# Patient Record
Sex: MALE | Race: WHITE | NOT HISPANIC OR LATINO | Employment: FULL TIME | URBAN - METROPOLITAN AREA
[De-identification: names, ages, dates, MRNs, and addresses within clinical notes are randomized per-mention and may not be internally consistent; named-entity substitution may affect disease eponyms.]

---

## 2017-10-24 ENCOUNTER — RECORDS - HEALTHEAST (OUTPATIENT)
Dept: LAB | Facility: CLINIC | Age: 32
End: 2017-10-24

## 2017-10-24 LAB — HIV 1+2 AB+HIV1 P24 AG SERPL QL IA: NEGATIVE

## 2017-10-25 LAB — SYPHILIS RPR SCREEN - HISTORICAL: NORMAL

## 2018-05-25 ENCOUNTER — RECORDS - HEALTHEAST (OUTPATIENT)
Dept: LAB | Facility: CLINIC | Age: 33
End: 2018-05-25

## 2018-05-25 LAB — HIV 1+2 AB+HIV1 P24 AG SERPL QL IA: NEGATIVE

## 2018-05-26 LAB — T PALLIDUM AB SER QL: NEGATIVE

## 2018-05-29 LAB
C TRACH DNA SPEC QL PROBE+SIG AMP: NEGATIVE
N GONORRHOEA DNA SPEC QL NAA+PROBE: NEGATIVE

## 2020-01-20 ENCOUNTER — COMMUNICATION - HEALTHEAST (OUTPATIENT)
Dept: SCHEDULING | Facility: CLINIC | Age: 35
End: 2020-01-20

## 2020-10-27 ENCOUNTER — COMMUNICATION - HEALTHEAST (OUTPATIENT)
Dept: SCHEDULING | Facility: CLINIC | Age: 35
End: 2020-10-27

## 2020-11-18 ENCOUNTER — RECORDS - HEALTHEAST (OUTPATIENT)
Dept: ADMINISTRATIVE | Facility: OTHER | Age: 35
End: 2020-11-18

## 2020-11-19 ENCOUNTER — OFFICE VISIT - HEALTHEAST (OUTPATIENT)
Dept: FAMILY MEDICINE | Facility: CLINIC | Age: 35
End: 2020-11-19

## 2020-11-19 DIAGNOSIS — R00.0 TACHYCARDIA: ICD-10-CM

## 2020-11-19 DIAGNOSIS — Z11.59 ENCOUNTER FOR HEPATITIS C SCREENING TEST FOR LOW RISK PATIENT: ICD-10-CM

## 2020-11-19 DIAGNOSIS — N52.8 OTHER MALE ERECTILE DYSFUNCTION: ICD-10-CM

## 2020-11-19 DIAGNOSIS — F41.9 ANXIETY: ICD-10-CM

## 2020-11-19 DIAGNOSIS — Z00.00 HEALTH CARE MAINTENANCE: ICD-10-CM

## 2020-11-19 LAB
ALBUMIN SERPL-MCNC: 3.6 G/DL (ref 3.5–5)
ALP SERPL-CCNC: 85 U/L (ref 45–120)
ALT SERPL W P-5'-P-CCNC: 144 U/L (ref 0–45)
ANION GAP SERPL CALCULATED.3IONS-SCNC: 10 MMOL/L (ref 5–18)
AST SERPL W P-5'-P-CCNC: 74 U/L (ref 0–40)
BILIRUB SERPL-MCNC: 0.2 MG/DL (ref 0–1)
BUN SERPL-MCNC: 19 MG/DL (ref 8–22)
CALCIUM SERPL-MCNC: 9.3 MG/DL (ref 8.5–10.5)
CHLORIDE BLD-SCNC: 101 MMOL/L (ref 98–107)
CHOLEST SERPL-MCNC: 255 MG/DL
CO2 SERPL-SCNC: 25 MMOL/L (ref 22–31)
CREAT SERPL-MCNC: 0.85 MG/DL (ref 0.7–1.3)
FASTING STATUS PATIENT QL REPORTED: NO
GFR SERPL CREATININE-BSD FRML MDRD: >60 ML/MIN/1.73M2
GLUCOSE BLD-MCNC: 84 MG/DL (ref 70–125)
HDLC SERPL-MCNC: 43 MG/DL
LDLC SERPL CALC-MCNC: 172 MG/DL
POTASSIUM BLD-SCNC: 4.5 MMOL/L (ref 3.5–5)
PROT SERPL-MCNC: 7.8 G/DL (ref 6–8)
SODIUM SERPL-SCNC: 136 MMOL/L (ref 136–145)
TRIGL SERPL-MCNC: 201 MG/DL

## 2020-11-19 RX ORDER — GABAPENTIN 300 MG/1
600 CAPSULE ORAL 3 TIMES DAILY
Status: SHIPPED | COMMUNITY
Start: 2020-11-06 | End: 2022-02-11

## 2020-11-19 RX ORDER — NALOXONE HYDROCHLORIDE 4 MG/.1ML
SPRAY NASAL
Status: SHIPPED | COMMUNITY
Start: 2020-11-06 | End: 2022-02-11

## 2020-11-19 RX ORDER — FLUOXETINE 40 MG/1
40 CAPSULE ORAL DAILY
Qty: 90 CAPSULE | Refills: 3 | Status: SHIPPED | OUTPATIENT
Start: 2020-11-19 | End: 2022-02-11

## 2020-11-19 RX ORDER — LORATADINE 10 MG/1
10 TABLET ORAL
Status: SHIPPED | COMMUNITY
Start: 2020-11-06

## 2020-11-19 RX ORDER — HYDROXYZINE PAMOATE 50 MG/1
50 CAPSULE ORAL
Status: SHIPPED | COMMUNITY
Start: 2020-11-06 | End: 2022-02-11

## 2020-11-19 RX ORDER — BUSPIRONE HYDROCHLORIDE 7.5 MG/1
7.5 TABLET ORAL 3 TIMES DAILY
Qty: 180 TABLET | Refills: 3 | Status: SHIPPED | OUTPATIENT
Start: 2020-11-19 | End: 2022-02-11

## 2020-11-19 ASSESSMENT — MIFFLIN-ST. JEOR: SCORE: 1946.9

## 2020-11-20 ENCOUNTER — COMMUNICATION - HEALTHEAST (OUTPATIENT)
Dept: FAMILY MEDICINE | Facility: CLINIC | Age: 35
End: 2020-11-20

## 2020-11-20 LAB — HCV AB SERPL QL IA: NEGATIVE

## 2020-12-11 ENCOUNTER — RECORDS - HEALTHEAST (OUTPATIENT)
Dept: ADMINISTRATIVE | Facility: OTHER | Age: 35
End: 2020-12-11

## 2021-02-10 ENCOUNTER — COMMUNICATION - HEALTHEAST (OUTPATIENT)
Dept: FAMILY MEDICINE | Facility: CLINIC | Age: 36
End: 2021-02-10

## 2021-02-10 DIAGNOSIS — N52.8 OTHER MALE ERECTILE DYSFUNCTION: ICD-10-CM

## 2021-02-10 RX ORDER — SILDENAFIL CITRATE 20 MG/1
20 TABLET ORAL
Qty: 5 TABLET | Refills: 0 | Status: SHIPPED | OUTPATIENT
Start: 2021-02-10 | End: 2022-02-11

## 2021-03-31 ENCOUNTER — COMMUNICATION - HEALTHEAST (OUTPATIENT)
Dept: FAMILY MEDICINE | Facility: CLINIC | Age: 36
End: 2021-03-31

## 2021-05-02 ENCOUNTER — HEALTH MAINTENANCE LETTER (OUTPATIENT)
Age: 36
End: 2021-05-02

## 2021-05-25 ENCOUNTER — COMMUNICATION - HEALTHEAST (OUTPATIENT)
Dept: FAMILY MEDICINE | Facility: CLINIC | Age: 36
End: 2021-05-25

## 2021-05-25 ENCOUNTER — OFFICE VISIT - HEALTHEAST (OUTPATIENT)
Dept: FAMILY MEDICINE | Facility: CLINIC | Age: 36
End: 2021-05-25

## 2021-05-25 DIAGNOSIS — L30.9 PERIORBITAL DERMATITIS: ICD-10-CM

## 2021-05-25 RX ORDER — CYCLOBENZAPRINE HCL 10 MG
TABLET ORAL
Status: SHIPPED | COMMUNITY
Start: 2020-12-02 | End: 2022-02-11

## 2021-05-25 RX ORDER — TACROLIMUS 1 MG/G
OINTMENT TOPICAL
Qty: 30 G | Refills: 0 | Status: SHIPPED | OUTPATIENT
Start: 2021-05-25 | End: 2022-02-11

## 2021-05-25 RX ORDER — DIAPER,BRIEF,INFANT-TODD,DISP
EACH MISCELLANEOUS
Qty: 30 G | Refills: 0 | Status: SHIPPED | OUTPATIENT
Start: 2021-05-25 | End: 2023-01-02

## 2021-05-25 ASSESSMENT — MIFFLIN-ST. JEOR: SCORE: 1992.71

## 2021-06-05 VITALS
SYSTOLIC BLOOD PRESSURE: 118 MMHG | HEIGHT: 71 IN | HEART RATE: 103 BPM | DIASTOLIC BLOOD PRESSURE: 88 MMHG | BODY MASS INDEX: 30.24 KG/M2 | OXYGEN SATURATION: 97 % | WEIGHT: 216 LBS

## 2021-06-05 NOTE — TELEPHONE ENCOUNTER
Girlfriend calling.for patient who has never been seen here at .  She states her boyfriend is home sick, and she is calling to have him seen.  Got Tamiflu. On Friday. [facility].  Still feeling ill after taking the Tamiflu., has one pill left.  Has had temp 100.0     Feels worse today.  And has never been seen here at , but wants a same day appointment here.  Unable to get authorization from patient to speak to girlfriend because he was at home, and she was at work.    Advised to make soonest appointment , tomorrow AM @ [facility]. With Dr. Alford   @ 8:20am.    Yecenia Mccormick RN  [facility] Triage/refill nurse

## 2021-06-12 NOTE — TELEPHONE ENCOUNTER
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 Just a FYI. He will be there for awhile per patient's girlfriend.   Okay to leave a detailed message?:  Yes

## 2021-06-13 NOTE — PROGRESS NOTES
Assessment/Plan:     Patient presents today for routine physical examination.    Healthcare Maintenance: USPSTF recommendations for age 35;  Patient has been counseled on/screened for:  - intimate partner violence and there are no concerns at this time  - a healthful diet and physical activity for CVD prevention  - Diabetes and hyperlipidemia: screening was performed.   Sexually transmitted infections: Patient would not like to be screened for chlamydia, gonorrhea, syphilis, HIV, and hepatitis  Immunizations: up to date except for td which was recommended    Additional concerns are as detailed below:    1. Anxiety  Refilled fluoxetine at his current dose so that he did not need to take 2 tablets.  Recommended buspirone be taken 3 times daily instead of twice daily.  Recommended hydroxyzine continue to be used at night, or decrease the dose so that he can use it during the day.  The increased dose and gabapentin should remain the same.  Continue therapy.  - FLUoxetine (PROZAC) 40 MG capsule; Take 1 capsule (40 mg total) by mouth daily.  Dispense: 90 capsule; Refill: 3  - busPIRone (BUSPAR) 7.5 MG tablet; Take 1 tablet (7.5 mg total) by mouth 3 (three) times a day.  Dispense: 180 tablet; Refill: 3    2. Other male erectile dysfunction  Discussed that he might need to see a urologist at some point to address the underlying concerns for someone this young experiencing erectile dysfunction.    3. Tachycardia  Patient is a history of tachycardia appreciated in the hospital.  The tachycardia is minimal today and he does have a history of anxiety, is meeting a new provider today, and recently had poor medical experiences.  It is reasonable to just continue to monitor as he is asymptomatic currently.    AVS printed and given to patient.  Return to clinic in 6 months.     I have had an Advance Directives discussion with the patient.    This note has been dictated using voice recognition software. Any grammatical or context  "distortions are unintentional and inherent to the the software.     Nicolette Burgos MD  [facility]    Subjective:     Rico Estrada is a 35 y.o. male who presents to clinic for routine physical.    Additional concerns include:    1.  Patient recently hospitalized for Ashby-Fritz's turned TENS.  He is overall doing well and attempting slow recovery at home.  He is attempting to walk every day but misses working out and feels deconditioned.  His mood has also been worsened during this timing.  He has baseline anxiety and recent hospitalization did not help.  He is currently on 7.5 mg buspirone twice daily and max dose of fluoxetine at 40 mg.  He does take gabapentin and hydroxyzine for nerve pain and itching, but understands these can also be used to help with anxiety.  Patient is seeing a therapist.  He does use marijuana intermittently.    PHQ-2 Score:  0    Health Care Directive: discussed    Patient Care Team:   PCP: Nicolette Burgos     Past Medical History, Family History, and Social History reviewed.     Review of systems:  Patient endorses: Weight changes, fever, weakness, fatigue, blurry vision, vision changes, wheezing, allergy symptoms, nausea, vomiting, back pain, rashes, increased thirst and sexual dysfunction  The remainder of the 10 system review is otherwise negative.    Objective:     /88   Pulse (!) 103   Ht 5' 11\" (1.803 m)   Wt 216 lb (98 kg)   SpO2 97%   BMI 30.13 kg/m    Gen: Alert, NAD, appears stated age, normal hygiene   Eyes: conjunctivae without injection, sclera clear, EOMI  ENT/mouth: nares clear, septum midline, absent rhinorrhea,absent pharyngeal injection, neck is supple, no thyroid enlargement  CV: RRR, no murmur appreciated, pedal edema absent bilaterally  Resp: CTAB, no wheezes, rales or ronchi  ABD: normoactive, non-tender to palpation, nondistended  MSK: grossly full range of motion in all joints, no obvious deformity  Neuro: CN II-XII " grossly intact, no deficits in coordination  Psych: no apparent hallucinations or delusions, no pressured speech; alert, oriented x3  SKIN: Diffuse erythematous plaques appreciated across most of patient's exposed skin  Heme/lymph: no pallor, no active bleeding/bruising, no adenopathy appreciated    Medications:  Current Outpatient Medications   Medication Sig     HYDROmorphone (DILAUDID) 2 MG tablet Take 1-2 mg by mouth.     hydrOXYzine pamoate (VISTARIL) 50 MG capsule Take 50 mg by mouth.     loratadine (CLARITIN) 10 mg tablet Take 10 mg by mouth.     methadone (DOLOPHINE) 5 MG tablet Take 5 mg by mouth.     busPIRone (BUSPAR) 7.5 MG tablet Take 1 tablet (7.5 mg total) by mouth 3 (three) times a day.     FLUoxetine (PROZAC) 40 MG capsule Take 1 capsule (40 mg total) by mouth daily.     gabapentin (NEURONTIN) 300 MG capsule Take 600 mg by mouth 3 (three) times a day.     NARCAN 4 mg/actuation nasal spray      sildenafil (REVATIO) 20 mg tablet TAKE 1 TO 2 TABLETS BY MOUTH AS NEEDED FOR ED       Allergies:  Allergies   Allergen Reactions     Sulfamethoxazole-Trimethoprim Rash     SJS, TENS       PMH:  Past Medical History:   Diagnosis Date     Allergies      Anxiety      TENS (toxic epidermal necrolysis) (H)     bactrim       PSH:  History reviewed. No pertinent surgical history.    Family Hx:  Family History   Problem Relation Age of Onset     Diabetes Mother      Heart disease Father      Hypertension Father      Stroke Paternal Uncle         56     Heart disease Paternal Grandmother      Heart disease Paternal Grandfather        Social History:  Social History     Socioeconomic History     Marital status: Single     Spouse name: Not on file     Number of children: Not on file     Years of education: Not on file     Highest education level: Not on file   Occupational History     Not on file   Social Needs     Financial resource strain: Not on file     Food insecurity     Worry: Not on file     Inability: Not on  file     Transportation needs     Medical: Not on file     Non-medical: Not on file   Tobacco Use     Smoking status: Never Smoker     Smokeless tobacco: Never Used   Substance and Sexual Activity     Alcohol use: Yes     Alcohol/week: 10.0 standard drinks     Types: 10 Cans of beer per week     Comment: 10 beers all at once but only 1-3 per month     Drug use: Yes     Types: Marijuana     Sexual activity: Yes     Partners: Female   Lifestyle     Physical activity     Days per week: Not on file     Minutes per session: Not on file     Stress: Not on file   Relationships     Social connections     Talks on phone: Not on file     Gets together: Not on file     Attends [employer] service: Not on file     Active member of club or organization: Not on file     Attends meetings of clubs or organizations: Not on file     Relationship status: Not on file     Intimate partner violence     Fear of current or ex partner: Not on file     Emotionally abused: Not on file     Physically abused: Not on file     Forced sexual activity: Not on file   Other Topics Concern     Not on file   Social History Narrative     Not on file       No results found for: LDLCALC     Immunization History   Administered Date(s) Administered     Hep B, Peds or Adolescent 08/08/2000     Influenza [id number], 11/27/2009     Influenza, Seasonal, Inj PF IIV3 11/27/2009, 10/02/2015     Influenza,inj,MDCK,PF,Quad >4yrs 11/15/2018     Influenza,seasonal, Inj IIV3 11/01/2005, 12/16/2011, 10/01/2012, 10/07/2014     Influenza,seasonal,quad inj =/> 6months 08/30/2017     MMR 08/08/2000     Td, adult adsorbed, PF 08/08/2000     Tdap 06/02/2009         Topic Date Due     HEPATITIS B VACCINES (2 of 3 - 3-dose primary series) 09/05/2000

## 2021-06-16 PROBLEM — R73.9 HYPERGLYCEMIA: Status: ACTIVE | Noted: 2020-10-30

## 2021-06-16 PROBLEM — R00.0 TACHYCARDIA: Status: ACTIVE | Noted: 2020-10-30

## 2021-06-16 PROBLEM — E87.1 HYPONATREMIA: Status: ACTIVE | Noted: 2020-10-30

## 2021-06-16 PROBLEM — L51.2: Status: ACTIVE | Noted: 2020-10-28

## 2021-06-16 PROBLEM — N52.8 OTHER MALE ERECTILE DYSFUNCTION: Status: ACTIVE | Noted: 2020-11-19

## 2021-06-17 NOTE — TELEPHONE ENCOUNTER
LMTCB- has apt today with Dr Peter at 1:40, wondering if he can came at 2:20 today instead. If unable to switch ok to keep original apt time    If patient reschedules we need to change the [address] to a 40 min apt

## 2021-06-18 ENCOUNTER — COMMUNICATION - HEALTHEAST (OUTPATIENT)
Dept: FAMILY MEDICINE | Facility: CLINIC | Age: 36
End: 2021-06-18

## 2021-06-18 DIAGNOSIS — L30.9 PERIORBITAL DERMATITIS: ICD-10-CM

## 2021-06-18 DIAGNOSIS — N52.8 OTHER MALE ERECTILE DYSFUNCTION: ICD-10-CM

## 2021-06-18 DIAGNOSIS — L29.9 PRURITUS: ICD-10-CM

## 2021-06-18 DIAGNOSIS — M62.838 SPASM OF MUSCLE: ICD-10-CM

## 2021-06-18 RX ORDER — HYDROXYZINE PAMOATE 50 MG/1
50 CAPSULE ORAL 3 TIMES DAILY PRN
Qty: 90 CAPSULE | Refills: 0 | Status: SHIPPED | OUTPATIENT
Start: 2021-06-18 | End: 2022-02-11

## 2021-06-18 RX ORDER — SILDENAFIL CITRATE 20 MG/1
20 TABLET ORAL DAILY PRN
Qty: 5 TABLET | Refills: 1 | Status: SHIPPED | OUTPATIENT
Start: 2021-06-18 | End: 2022-02-11

## 2021-06-18 RX ORDER — CYCLOBENZAPRINE HCL 10 MG
10 TABLET ORAL 3 TIMES DAILY PRN
Qty: 30 TABLET | Refills: 1 | Status: SHIPPED | OUTPATIENT
Start: 2021-06-18 | End: 2022-02-11

## 2021-06-21 NOTE — LETTER
Letter by Nicolette Burgos MD at      Author: Nicolette Burgos MD Service: -- Author Type: --    Filed:  Encounter Date: 11/20/2020 Status: (Other)         Rico Estrada  [address]  [place] [address]             November 20, 2020         Dear Mr. Estrada,    Below are the results from your recent visit:    Resulted Orders   Comprehensive Metabolic Panel   Result Value Ref Range    Sodium 136 136 - 145 mmol/L    Potassium 4.5 3.5 - 5.0 mmol/L    Chloride 101 98 - 107 mmol/L    CO2 25 22 - 31 mmol/L    Anion Gap, Calculation 10 5 - 18 mmol/L    Glucose 84 70 - 125 mg/dL    BUN 19 8 - 22 mg/dL    Creatinine 0.85 0.70 - 1.30 mg/dL    GFR MDRD Af Amer >60 >60 mL/min/1.73m2    GFR MDRD Non Af Amer >60 >60 mL/min/1.73m2    Bilirubin, Total 0.2 0.0 - 1.0 mg/dL    Calcium 9.3 8.5 - 10.5 mg/dL    Protein, Total 7.8 6.0 - 8.0 g/dL    Albumin 3.6 3.5 - 5.0 g/dL    Alkaline Phosphatase 85 45 - 120 U/L    AST 74 (H) 0 - 40 U/L     (H) 0 - 45 U/L    Narrative    Fasting Glucose reference range is 70-99 mg/dL per  American Diabetes Association (ADA) guidelines.   Lipid Cascade RANDOM   Result Value Ref Range    Cholesterol 255 (H) <=199 mg/dL    Triglycerides 201 (H) <=149 mg/dL    HDL Cholesterol 43 >=40 mg/dL    LDL Calculated 172 (H) <=129 mg/dL    Patient Fasting > 8hrs? No        Your hepatitis C is still pending at the time that I made this letter, but we anticipate that that would be negative.  Someone will call you if that is not true.    Your cholesterol is significantly elevated, but not so high that we would start a statin medication given your age.  However, you are headed in that direction and I think it might be worthwhile, once things settle down, to focus on diet that would lower cholesterol.  If you have any questions about this you can reach out to me.    Finally, your liver function testing indicated some significantly elevated values.  I suspect this is secondary to the intense  trauma you just experienced.  I think that we should retest this in 3 months and not worry about it right now.    Please call with questions or contact us using [facility].    Sincerely,        Electronically signed by Nicolette Burgos MD

## 2021-06-25 NOTE — PROGRESS NOTES
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  Believe this is more of a coincidence than a true causation.  Believe this most likely represents periorbital dermatitis but differential includes contact dermatitis, fungal, among others.  We will trial patient on combination tacrolimus and hydrocortisone ointment.  Recommended he do these twice daily for 2 weeks and then contact me if symptoms are not improving.  At that time would reconsider and possibly refer to dermatology for further work-up.    1. Periorbital dermatitis  - tacrolimus (PROTOPIC) 0.1 % ointment; Apply around eyes twice daily for up to two weeks  Dispense: 30 g; Refill: 0  - hydrocortisone 1 % ointment; Apply around eyes twice daily for up to two weeks  Dispense: 30 g; Refill: 0    Follow up: PRN  Options for treatment and follow-up care were reviewed with the patient and/or guardian. Rico Estrada and/or guardian engaged in the decision making process and verbalized understanding of the options discussed and agreed with the final plan.    Dr. Francisco Gonzalez MD         HPI:   Rico Estrada is a 35 y.o.  male with problems as below, who presents for:    Chief Complaint   Patient presents with     Rash     rash developed shortly after getting 2nd covid vaccine, itches and burns at times     Low energy     very low energy since getting the covid vaccine     Patient received his Covid shot second dose in series approximately 3 weeks ago.  He states that he developed a rash over his eyes very next day.  This has not really changed since it started.  He describes it as dry irritated and sometimes pruritic.  He has never had a rash like this before and no other spots on his body contain the rash.  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   "Confirmed this in the chart causing admission in October.  Thus he states he has had skin changes related to this even after his recovery.  Does not think this is related though.         PMHX:     Patient Active Problem List   Diagnosis     Hyperglycemia     Hyponatremia     Tachycardia     Toxic epidermal necrolysis (H)     Other male erectile dysfunction       Current Outpatient Medications   Medication Sig Dispense Refill     busPIRone (BUSPAR) 7.5 MG tablet Take 1 tablet (7.5 mg total) by mouth 3 (three) times a day. 180 tablet 3     cyclobenzaprine (FLEXERIL) 10 MG tablet        FLUoxetine (PROZAC) 40 MG capsule Take 1 capsule (40 mg total) by mouth daily. 90 capsule 3     gabapentin (NEURONTIN) 300 MG capsule Take 600 mg by mouth 3 (three) times a day.       hydrOXYzine pamoate (VISTARIL) 50 MG capsule Take 50 mg by mouth.       loratadine (CLARITIN) 10 mg tablet Take 10 mg by mouth.       NARCAN 4 mg/actuation nasal spray        sildenafil (REVATIO) 20 mg tablet Take 1 tablet (20 mg total) by mouth once as needed. 5 tablet 0     No current facility-administered medications for this visit.        Social History     Tobacco Use     Smoking status: Never Smoker     Smokeless tobacco: Never Used   Substance Use Topics     Alcohol use: Yes     Alcohol/week: 10.0 standard drinks     Types: 10 Cans of beer per week     Comment: 10 beers all at once but only 1-3 per month     Drug use: Yes     Types: Marijuana       Social History     Social History Narrative     Not on file       Allergies   Allergen Reactions     Sulfamethoxazole-Trimethoprim Rash     SJS, TENS              Review of Systems:    Complete ROS is negative except as noted in the HPI         Physical Exam:   /84   Pulse 69   Temp 98.4  F (36.9  C) (Oral)   Resp 16   Ht 5' 11\" (1.803 m)   Wt (!) 226 lb 1.6 oz (102.6 kg)   SpO2 96%   BMI 31.53 kg/m      General appearance: Alert, cooperative, no distress, appears stated age  Head: " Normocephalic, atraumatic, without obvious abnormality  Eyes: Pupils equal round, reactive.  Conjunctiva clear.  Neck: Supple, symmetric, trachea midline  Lungs: Clear to auscultation bilaterally, no wheezing or crackles present.  Respirations unlabored  Heart: Regular rate and rhythm, normal S1 and S2, no murmur, rub or gallop.  Skin: Xerotic, flaky, irritated rash over eyelids bilaterally.  No central clearing or water mani type rash.  No nodularity to rash.  Spares the eyelashes.

## 2021-06-26 NOTE — TELEPHONE ENCOUNTER
Patient is calling to request a refill of hydroxyzine as needed for itching due to seasonal allergies, cyclobenzaprine, and sildenafil.    He states when he was in on 5/25/2021 to see Dr. Lundy he discussed these refills with Dr. Lundy's assistant however refills were never sent to the pharmacy.      Please refill if appropriate.  Medication name, dose, and instructions reviewed with the patient.

## 2021-06-26 NOTE — TELEPHONE ENCOUNTER
RN cannot approve Refill Request    RN can NOT refill this medication med is not covered by policy/route to provider. Last office visit: 5/25/2021 Franicsco Lundy MD Last Physical: Visit date not found Last MTM visit: Visit date not found Last visit same specialty: 5/25/2021 Francisco Lundy MD.  Next visit within 3 mo: Visit date not found  Next physical within 3 mo: Visit date not found      Delaney Kenny, [facility] Connection Triage/Med Refill 6/18/2021    Requested Prescriptions   Pending Prescriptions Disp Refills     hydrocortisone 1 % ointment [Pharmacy Med Name: HYDROCORTISONE 1% OINTMENT  28.35GM] 28.35 g 0     Sig: APPLY AROUND EYES TWICE DAILY FOR UP TO TWO WEEKS.       There is no refill protocol information for this order        tacrolimus (PROTOPIC) 0.1 % ointment [Pharmacy Med Name: PROTOPIC 0.1% OINTMENT 30GM] 30 g 0     Sig: APPLY AROUND EYES TWICE DAILY FOR UP TO 2 WEEKS.       There is no refill protocol information for this order

## 2021-06-30 ENCOUNTER — COMMUNICATION - HEALTHEAST (OUTPATIENT)
Dept: FAMILY MEDICINE | Facility: CLINIC | Age: 36
End: 2021-06-30

## 2021-06-30 DIAGNOSIS — L30.9 PERIORBITAL DERMATITIS: ICD-10-CM

## 2021-07-01 RX ORDER — TACROLIMUS 1 MG/G
OINTMENT TOPICAL
Qty: 30 G | Refills: 0 | Status: SHIPPED | OUTPATIENT
Start: 2021-07-01 | End: 2023-01-02

## 2021-07-02 ENCOUNTER — COMMUNICATION - HEALTHEAST (OUTPATIENT)
Dept: FAMILY MEDICINE | Facility: CLINIC | Age: 36
End: 2021-07-02

## 2021-07-02 DIAGNOSIS — L30.9 PERIORBITAL DERMATITIS: ICD-10-CM

## 2021-07-02 RX ORDER — DIAPER,BRIEF,INFANT-TODD,DISP
EACH MISCELLANEOUS
Qty: 28.35 G | Refills: 0 | Status: SHIPPED | OUTPATIENT
Start: 2021-07-02 | End: 2023-01-02

## 2021-07-04 NOTE — TELEPHONE ENCOUNTER
Telephone Encounter by Gricel Cisneros RN at 6/30/2021  7:59 AM     Author: Gricel Cisneros RN Service: -- Author Type: Registered Nurse    Filed: 6/30/2021  7:59 AM Encounter Date: 6/30/2021 Status: Signed    : Gricel Cisneros RN (Registered Nurse)       RN cannot approve Refill Request    RN can NOT refill this medication med is not covered by policy/route to provider. Last office visit: 5/25/2021 Francisco Lundy MD Last Physical: Visit date not found Last MTM visit: Visit date not found Last visit same specialty: 5/25/2021 Francisco Lundy MD.  Next visit within 3 mo: Visit date not found  Next physical within 3 mo: Visit date not found      Gricel Cisneros [facility] Connection Triage/Med Refill 6/30/2021    Requested Prescriptions   Pending Prescriptions Disp Refills   ? tacrolimus (PROTOPIC) 0.1 % ointment [Pharmacy Med Name: PROTOPIC 0.1% OINTMENT 30GM] 30 g 0     Sig: APPLY AROUND EYES TWICE DAILY FOR UP TO 2 WEEKS.       There is no refill protocol information for this order

## 2021-07-04 NOTE — TELEPHONE ENCOUNTER
Telephone Encounter by Kel Wagoner, RN at 7/2/2021  7:37 AM     Author: Kel Wagoner, RN Service: -- Author Type: Registered Nurse    Filed: 7/2/2021  7:38 AM Encounter Date: 7/2/2021 Status: Signed    : Kel Wagoner RN (Registered Nurse)       RN cannot approve Refill Request    RN can NOT refill this medication med is not covered by policy/route to provider. Last office visit: 5/25/2021 Francisco Lundy MD Last Physical: Visit date not found Last MTM visit: Visit date not found Last visit same specialty: 5/25/2021 Francisco Lundy MD.  Next visit within 3 mo: Visit date not found  Next physical within 3 mo: Visit date not found      Kel Wagoner, [facility] Triage/Med Refill 7/2/2021    Requested Prescriptions   Pending Prescriptions Disp Refills   ? hydrocortisone 1 % ointment [Pharmacy Med Name: HYDROCORTISONE 1% OINTMENT  28.35GM] 28.35 g 0     Sig: APPLY AROUND EYES TWICE DAILY FOR UP TO TWO WEEKS.       There is no refill protocol information for this order

## 2021-07-06 VITALS
SYSTOLIC BLOOD PRESSURE: 112 MMHG | WEIGHT: 226.1 LBS | HEIGHT: 71 IN | OXYGEN SATURATION: 96 % | BODY MASS INDEX: 31.65 KG/M2 | HEART RATE: 69 BPM | RESPIRATION RATE: 16 BRPM | TEMPERATURE: 98.4 F | DIASTOLIC BLOOD PRESSURE: 84 MMHG

## 2021-07-19 DIAGNOSIS — L30.9 PERIORBITAL DERMATITIS: Primary | ICD-10-CM

## 2021-07-22 RX ORDER — DIAPER,BRIEF,INFANT-TODD,DISP
EACH MISCELLANEOUS
Qty: 28.35 G | Refills: 3 | Status: SHIPPED | OUTPATIENT
Start: 2021-07-22 | End: 2022-02-11

## 2021-07-22 NOTE — TELEPHONE ENCOUNTER
Routing refill request to provider for review/approval because:  Drug not on the refill protocol     Last Written Prescription Date:  7/2/2021  Last Fill Quantity: 28.35g,  # refills: 0   Last office visit provider:  5/25/2021     Requested Prescriptions   Pending Prescriptions Disp Refills     hydrocortisone (CORTAID) 1 % external ointment [Pharmacy Med Name: HYDROCORTISONE 1% OINTMENT  28.35GM] 28.35 g      Sig: APPLY AROUND EYES TWICE DAILY FOR UP TO TWO WEEKS.       There is no refill protocol information for this order          Tammie Severson, LPN 07/22/21 12:51 PM

## 2021-10-17 ENCOUNTER — HEALTH MAINTENANCE LETTER (OUTPATIENT)
Age: 36
End: 2021-10-17

## 2021-12-27 ENCOUNTER — TELEPHONE (OUTPATIENT)
Dept: FAMILY MEDICINE | Facility: CLINIC | Age: 36
End: 2021-12-27
Payer: COMMERCIAL

## 2021-12-27 NOTE — TELEPHONE ENCOUNTER
No answer, LUCRETIA full  Called pt to schedule video visit for ring worm this week per dr. ramsey

## 2022-02-06 ENCOUNTER — HEALTH MAINTENANCE LETTER (OUTPATIENT)
Age: 37
End: 2022-02-06

## 2022-02-11 ENCOUNTER — OFFICE VISIT (OUTPATIENT)
Dept: FAMILY MEDICINE | Facility: CLINIC | Age: 37
End: 2022-02-11
Payer: COMMERCIAL

## 2022-02-11 VITALS
OXYGEN SATURATION: 99 % | DIASTOLIC BLOOD PRESSURE: 78 MMHG | WEIGHT: 216 LBS | HEART RATE: 80 BPM | SYSTOLIC BLOOD PRESSURE: 120 MMHG | BODY MASS INDEX: 30.13 KG/M2

## 2022-02-11 DIAGNOSIS — N52.8 OTHER MALE ERECTILE DYSFUNCTION: ICD-10-CM

## 2022-02-11 DIAGNOSIS — R21 RASH AND NONSPECIFIC SKIN ERUPTION: Primary | ICD-10-CM

## 2022-02-11 DIAGNOSIS — D23.9 DERMATOFIBROMA: ICD-10-CM

## 2022-02-11 DIAGNOSIS — F41.9 ANXIETY: ICD-10-CM

## 2022-02-11 DIAGNOSIS — M62.838 SPASM OF MUSCLE: ICD-10-CM

## 2022-02-11 DIAGNOSIS — Z23 ENCOUNTER FOR IMMUNIZATION: ICD-10-CM

## 2022-02-11 PROBLEM — G89.29 CHRONIC PAIN: Status: ACTIVE | Noted: 2022-02-11

## 2022-02-11 PROBLEM — M54.50 LOW BACK PAIN: Status: ACTIVE | Noted: 2022-02-11

## 2022-02-11 PROBLEM — M54.30 SCIATICA: Status: ACTIVE | Noted: 2022-02-11

## 2022-02-11 PROBLEM — F33.1 MODERATE RECURRENT MAJOR DEPRESSION (H): Status: ACTIVE | Noted: 2022-02-11

## 2022-02-11 PROCEDURE — 0064A COVID-19,PF,MODERNA (18+ YRS BOOSTER .25ML): CPT | Performed by: FAMILY MEDICINE

## 2022-02-11 PROCEDURE — 91306 COVID-19,PF,MODERNA (18+ YRS BOOSTER .25ML): CPT | Performed by: FAMILY MEDICINE

## 2022-02-11 PROCEDURE — 99214 OFFICE O/P EST MOD 30 MIN: CPT | Performed by: FAMILY MEDICINE

## 2022-02-11 RX ORDER — SILDENAFIL CITRATE 20 MG/1
20 TABLET ORAL DAILY PRN
Qty: 5 TABLET | Refills: 1 | Status: SHIPPED | OUTPATIENT
Start: 2022-02-11 | End: 2023-01-02

## 2022-02-11 RX ORDER — CYCLOBENZAPRINE HCL 10 MG
10 TABLET ORAL 3 TIMES DAILY PRN
Qty: 30 TABLET | Refills: 1 | Status: SHIPPED | OUTPATIENT
Start: 2022-02-11 | End: 2022-03-17

## 2022-02-11 RX ORDER — PROPRANOLOL HYDROCHLORIDE 10 MG/1
10 TABLET ORAL 3 TIMES DAILY PRN
Qty: 30 TABLET | Refills: 3 | Status: SHIPPED | OUTPATIENT
Start: 2022-02-11 | End: 2022-09-08

## 2022-02-11 RX ORDER — SILDENAFIL CITRATE 20 MG/1
20 TABLET ORAL
Qty: 30 TABLET | Refills: 0 | Status: SHIPPED | OUTPATIENT
Start: 2022-02-11 | End: 2022-10-04

## 2022-02-11 RX ORDER — FLUOXETINE 40 MG/1
40 CAPSULE ORAL DAILY
Qty: 90 CAPSULE | Refills: 3 | Status: SHIPPED | OUTPATIENT
Start: 2022-02-11 | End: 2023-01-02

## 2022-02-11 RX ORDER — GABAPENTIN 300 MG/1
600 CAPSULE ORAL 3 TIMES DAILY
Qty: 90 CAPSULE | Refills: 3 | Status: SHIPPED | OUTPATIENT
Start: 2022-02-11 | End: 2023-01-02

## 2022-02-11 RX ORDER — TRIAMCINOLONE ACETONIDE 5 MG/G
CREAM TOPICAL
COMMUNITY
End: 2023-04-24

## 2022-02-11 RX ORDER — HYDROXYZINE PAMOATE 50 MG/1
50 CAPSULE ORAL 3 TIMES DAILY PRN
Qty: 90 CAPSULE | Refills: 0 | Status: SHIPPED | OUTPATIENT
Start: 2022-02-11 | End: 2022-03-10

## 2022-02-11 ASSESSMENT — PATIENT HEALTH QUESTIONNAIRE - PHQ9: SUM OF ALL RESPONSES TO PHQ QUESTIONS 1-9: 8

## 2022-02-11 NOTE — PROGRESS NOTES
"  Assessment & Plan       Anxiety  Hx of anxiety reporting increased anxiety symptoms on Sunday nights. Discussed risks of benzodiazepines, counseled on as needed propanolol to help with physical symptoms of anxiety. Patient agreeable. Psych referral placed. Continue baseline meds of hydroxyzine and gabapentin PRN and fluoxetine daily.   - gabapentin (NEURONTIN) 300 MG capsule; Take 2 capsules (600 mg) by mouth 3 times daily  - FLUoxetine (PROZAC) 40 MG capsule; Take 1 capsule (40 mg) by mouth daily  - propranolol (INDERAL) 10 MG tablet; Take 1 tablet (10 mg) by mouth 3 times daily as needed  - hydrOXYzine (VISTARIL) 50 MG capsule; Take 1 capsule (50 mg) by mouth 3 times daily as needed  - Adult Mental Health  Referral; Future    Other male erectile dysfunction  Requesting refill.  - sildenafil (REVATIO) 20 MG tablet; Take 1 tablet (20 mg) by mouth daily as needed    Spasm of muscle  Patient also started seeing chiropractor for right sided pain. May consider physical therapy referral in the future if no relief.  - cyclobenzaprine (FLEXERIL) 10 MG tablet; Take 1 tablet (10 mg) by mouth 3 times daily as needed    Rash and nonspecific skin eruption  Patient reports history of intermittent rash on posterior neck and back. Previously diagnosed as ringworm and treated with ketoconazole and Diflucan. Brings with pictures that reveal 3-4 perfectly shaped red circles with central clearing and evenly spaced along the posterior scalp/neck. Uncertain that this is consistent with ringworm. Plan to refer to dermatology. Considered self-inflicted skin manifestations and psoriasis,neither of which seem to fit the clinical picture.   - Adult Dermatology Referral; Future    Dermatofibroma  Patient reports years of \"bump\" on his left back. Exam consistent with dermatofibroma. Counseled on benign nature of this finding. Patient would like this removed eventually. May do this in the clinic otherwise can be done by dermatology. " "    Encounter for immunization  - COVID-19,PF,MODERNA (18+ YRS BOOSTER .25ML)       BMI:   Estimated body mass index is 30.13 kg/m  as calculated from the following:    Height as of 5/25/21: 1.803 m (5' 11\").    Weight as of this encounter: 98 kg (216 lb).       Return in about 4 weeks (around 3/11/2022) for Routine preventive, Follow up.    Nicolette Burgos MD  [facility] OAKMALIK Gómez is a 36 year old who presents for the following health issues     HPI     Review of Systems   ROS negative except joint pain, acne, headaches.     Ringworm:   Went to [facility] in August for ringworm like rash on the back of his neck and hairline, sometimes itchy. Told to take OTC athlete's foot topical cream but it didn't help. Then seen by thierry and was prescribed prescription ketoconazole and oral Diflucan. Made it go away, but it returned a week later. Brings photos- on the back of head, 3-4 distinct circles on posterior neck with spreading down back. Last episode in December, was taking ketoconazole cream for a month and then it went away. No symptoms today. 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     Rib pain:   Treated by chiro but requesting Cyclobenzprine refill. States he has been trying stretching. Would consider PT referral.     Bump on back:   Flesh covered bump on back for his whole life, wants it removed. States sometimes he pops it and white stuff comes out and it flattens, but it returns.     Left ear pain:  Used Qtip last week, went too deep. Still hurts. Denies ringing or hearing loss.     Anxiety:   Reports increased anxiety on Sunday nights. Wondering about xanax or seeing psych for this.      Objective    /78   Pulse 80   Wt 98 kg (216 lb)   SpO2 99%   BMI 30.13 kg/m    Body mass index is 30.13 kg/m .  Physical Exam   GENERAL: healthy, alert and no distress  HENT: Left ear canal erythematous but intact, pearly grey " TM. Right ear canal clear with intact, pearly grey TM.  Nose and mouth without ulcers or lesions  RESP: lungs clear to auscultation - no rales, rhonchi or wheezes  CV: regular rate and rhythm, normal S1 S2, no S3 or S4, no murmur, click or rub.  SKIN: no suspicious lesions or rashes. Left mid-back with 1.5 cm firm, flesh-colored raised bump consistent with dermatofibroma.   PSYCH: mentation appears normal, affect normal/bright

## 2022-02-15 ENCOUNTER — TELEPHONE (OUTPATIENT)
Dept: FAMILY MEDICINE | Facility: CLINIC | Age: 37
End: 2022-02-15
Payer: COMMERCIAL

## 2022-02-15 NOTE — TELEPHONE ENCOUNTER
PA Initiation    Medication:sildenafil (REVATIO) 20 MG tablet   Insurance Company:  AKILA COLLIER [[id number]]  Pharmacy Filling the Rx:  [facility]   Filling Pharmacy Phone:  [phone]  Filling Pharmacy Fax:  [phone]  Start Date:  02/11/2022    GORDON WHITT

## 2022-02-21 NOTE — TELEPHONE ENCOUNTER
PA Initiation    Medication: sildenafil (REVATIO) 20 MG tablet  Insurance Company: [employer] - Phone [phone] Fax [phone]  Pharmacy Filling the Rx: [id number] - [place], [place] - [address] & [facility]  Filling Pharmacy Phone: [phone]  Filling Pharmacy Fax: [phone]  Start Date: 2/21/2022

## 2022-02-21 NOTE — TELEPHONE ENCOUNTER
PRIOR AUTHORIZATION DENIED    Medication: sildenafil (REVATIO) 20 MG tablet    Denial Date: 2/21/2022    Denial Rationale: Medication is only covered for diagnosis of pulmonary arterial hypertension or secondary Raynaud's phenomenon.          Appeal Information: If provider would like to appeal this decision we will need a detailed letter of medical necessity to start the process. Then re-route this request back to the PA pool.

## 2022-03-10 DIAGNOSIS — L29.9 PRURITUS: Primary | ICD-10-CM

## 2022-03-10 RX ORDER — HYDROXYZINE PAMOATE 50 MG/1
CAPSULE ORAL
Qty: 270 CAPSULE | Refills: 3 | Status: SHIPPED | OUTPATIENT
Start: 2022-03-10

## 2022-03-10 NOTE — TELEPHONE ENCOUNTER
"Last Written Prescription Date:  2/11/22  Last Fill Quantity: 90,  # refills: 0   Last office visit provider:  2/11/22     Requested Prescriptions   Pending Prescriptions Disp Refills     hydrOXYzine (VISTARIL) 50 MG capsule [Pharmacy Med Name: HYDROXYZINE PAMOATE 50MG CAPSULES] 90 capsule 0     Sig: TAKE 1 CAPSULE(50 MG) BY MOUTH THREE TIMES DAILY AS NEEDED       Antihistamines Protocol Passed - 3/10/2022 12:39 PM        Passed - Recent (12 mo) or future (30 days) visit within the authorizing provider's specialty     Patient has had an office visit with the authorizing provider or a provider within the authorizing providers department within the previous 12 mos or has a future within next 30 days. See \"Patient Info\" tab in inbasket, or \"Choose Columns\" in Meds & Orders section of the refill encounter.              Passed - Patient is age 3 or older     Apply age and/or weight-based dosing for peds patients age 3 and older.    Forward request to provider for patients under the age of 3.          Passed - Medication is active on med list             Kel Wagoner RN 03/10/22 12:40 PM  "
Need for prophylactic measure

## 2022-03-17 DIAGNOSIS — M62.838 SPASM OF MUSCLE: ICD-10-CM

## 2022-03-17 RX ORDER — CYCLOBENZAPRINE HCL 10 MG
TABLET ORAL
Qty: 30 TABLET | Refills: 1 | Status: SHIPPED | OUTPATIENT
Start: 2022-03-17 | End: 2023-01-02

## 2022-03-17 NOTE — TELEPHONE ENCOUNTER
Routing refill request to provider for review/approval because:  Drug not on the [facility] refill protocol     Last Written Prescription Date:  2/11/22  Last Fill Quantity: 30,  # refills: 1   Last office visit provider:  2/11/22     Requested Prescriptions   Pending Prescriptions Disp Refills     cyclobenzaprine (FLEXERIL) 10 MG tablet [Pharmacy Med Name: CYCLOBENZAPRINE 10MG TABLETS] 30 tablet 1     Sig: TAKE 1 TABLET(10 MG) BY MOUTH THREE TIMES DAILY AS NEEDED       There is no refill protocol information for this order          Kel Wagoner RN 03/17/22 10:13 AM

## 2022-10-01 ENCOUNTER — HEALTH MAINTENANCE LETTER (OUTPATIENT)
Age: 37
End: 2022-10-01

## 2022-12-30 ENCOUNTER — VIRTUAL VISIT (OUTPATIENT)
Dept: FAMILY MEDICINE | Facility: CLINIC | Age: 37
End: 2022-12-30
Payer: COMMERCIAL

## 2022-12-30 ENCOUNTER — ANCILLARY PROCEDURE (OUTPATIENT)
Dept: GENERAL RADIOLOGY | Facility: CLINIC | Age: 37
End: 2022-12-30
Attending: STUDENT IN AN ORGANIZED HEALTH CARE EDUCATION/TRAINING PROGRAM
Payer: COMMERCIAL

## 2022-12-30 DIAGNOSIS — R09.81 NASAL CONGESTION: Primary | ICD-10-CM

## 2022-12-30 DIAGNOSIS — R05.9 COUGH, UNSPECIFIED TYPE: ICD-10-CM

## 2022-12-30 DIAGNOSIS — J02.9 SORE THROAT: ICD-10-CM

## 2022-12-30 DIAGNOSIS — Z13.220 SCREENING FOR HYPERCHOLESTEROLEMIA: ICD-10-CM

## 2022-12-30 LAB
ALBUMIN SERPL BCG-MCNC: 4.5 G/DL (ref 3.5–5.2)
ALP SERPL-CCNC: 96 U/L (ref 40–129)
ALT SERPL W P-5'-P-CCNC: 27 U/L (ref 10–50)
ANION GAP SERPL CALCULATED.3IONS-SCNC: 12 MMOL/L (ref 7–15)
AST SERPL W P-5'-P-CCNC: 30 U/L (ref 10–50)
BASOPHILS # BLD AUTO: 0 10E3/UL (ref 0–0.2)
BASOPHILS NFR BLD AUTO: 0 %
BILIRUB SERPL-MCNC: 0.2 MG/DL
BUN SERPL-MCNC: 16.1 MG/DL (ref 6–20)
CALCIUM SERPL-MCNC: 9.4 MG/DL (ref 8.6–10)
CHLORIDE SERPL-SCNC: 102 MMOL/L (ref 98–107)
CHOLEST SERPL-MCNC: 288 MG/DL
CREAT SERPL-MCNC: 0.77 MG/DL (ref 0.67–1.17)
CRP SERPL-MCNC: 8.85 MG/L
DEPRECATED HCO3 PLAS-SCNC: 23 MMOL/L (ref 22–29)
DEPRECATED S PYO AG THROAT QL EIA: NEGATIVE
EOSINOPHIL # BLD AUTO: 0.3 10E3/UL (ref 0–0.7)
EOSINOPHIL NFR BLD AUTO: 5 %
ERYTHROCYTE [DISTWIDTH] IN BLOOD BY AUTOMATED COUNT: 12.4 % (ref 10–15)
ERYTHROCYTE [SEDIMENTATION RATE] IN BLOOD BY WESTERGREN METHOD: 10 MM/HR (ref 0–15)
FLUAV RNA SPEC QL NAA+PROBE: NEGATIVE
FLUBV RNA RESP QL NAA+PROBE: NEGATIVE
GFR SERPL CREATININE-BSD FRML MDRD: >90 ML/MIN/1.73M2
GLUCOSE SERPL-MCNC: 100 MG/DL (ref 70–99)
GROUP A STREP BY PCR: NOT DETECTED
HCT VFR BLD AUTO: 41.8 % (ref 40–53)
HDLC SERPL-MCNC: 48 MG/DL
HGB BLD-MCNC: 14.2 G/DL (ref 13.3–17.7)
IMM GRANULOCYTES # BLD: 0 10E3/UL
IMM GRANULOCYTES NFR BLD: 0 %
LDLC SERPL CALC-MCNC: 200 MG/DL
LYMPHOCYTES # BLD AUTO: 2.5 10E3/UL (ref 0.8–5.3)
LYMPHOCYTES NFR BLD AUTO: 35 %
MCH RBC QN AUTO: 29.8 PG (ref 26.5–33)
MCHC RBC AUTO-ENTMCNC: 34 G/DL (ref 31.5–36.5)
MCV RBC AUTO: 88 FL (ref 78–100)
MONOCYTES # BLD AUTO: 0.8 10E3/UL (ref 0–1.3)
MONOCYTES NFR BLD AUTO: 11 %
MONOCYTES NFR BLD AUTO: NEGATIVE %
NEUTROPHILS # BLD AUTO: 3.5 10E3/UL (ref 1.6–8.3)
NEUTROPHILS NFR BLD AUTO: 50 %
NONHDLC SERPL-MCNC: 240 MG/DL
PLATELET # BLD AUTO: 288 10E3/UL (ref 150–450)
POTASSIUM SERPL-SCNC: 4 MMOL/L (ref 3.4–5.3)
PROT SERPL-MCNC: 7.2 G/DL (ref 6.4–8.3)
RBC # BLD AUTO: 4.76 10E6/UL (ref 4.4–5.9)
RSV RNA SPEC NAA+PROBE: NEGATIVE
SARS-COV-2 RNA RESP QL NAA+PROBE: NEGATIVE
SODIUM SERPL-SCNC: 137 MMOL/L (ref 136–145)
TRIGL SERPL-MCNC: 198 MG/DL
WBC # BLD AUTO: 7.1 10E3/UL (ref 4–11)

## 2022-12-30 PROCEDURE — 87651 STREP A DNA AMP PROBE: CPT | Performed by: STUDENT IN AN ORGANIZED HEALTH CARE EDUCATION/TRAINING PROGRAM

## 2022-12-30 PROCEDURE — 86140 C-REACTIVE PROTEIN: CPT | Performed by: STUDENT IN AN ORGANIZED HEALTH CARE EDUCATION/TRAINING PROGRAM

## 2022-12-30 PROCEDURE — 71046 X-RAY EXAM CHEST 2 VIEWS: CPT | Mod: TC | Performed by: RADIOLOGY

## 2022-12-30 PROCEDURE — 86308 HETEROPHILE ANTIBODY SCREEN: CPT | Performed by: STUDENT IN AN ORGANIZED HEALTH CARE EDUCATION/TRAINING PROGRAM

## 2022-12-30 PROCEDURE — 80053 COMPREHEN METABOLIC PANEL: CPT | Performed by: STUDENT IN AN ORGANIZED HEALTH CARE EDUCATION/TRAINING PROGRAM

## 2022-12-30 PROCEDURE — 87637 SARSCOV2&INF A&B&RSV AMP PRB: CPT | Performed by: STUDENT IN AN ORGANIZED HEALTH CARE EDUCATION/TRAINING PROGRAM

## 2022-12-30 PROCEDURE — 36415 COLL VENOUS BLD VENIPUNCTURE: CPT | Performed by: STUDENT IN AN ORGANIZED HEALTH CARE EDUCATION/TRAINING PROGRAM

## 2022-12-30 PROCEDURE — 85652 RBC SED RATE AUTOMATED: CPT | Performed by: STUDENT IN AN ORGANIZED HEALTH CARE EDUCATION/TRAINING PROGRAM

## 2022-12-30 PROCEDURE — 80061 LIPID PANEL: CPT | Performed by: STUDENT IN AN ORGANIZED HEALTH CARE EDUCATION/TRAINING PROGRAM

## 2022-12-30 PROCEDURE — 85025 COMPLETE CBC W/AUTO DIFF WBC: CPT | Performed by: STUDENT IN AN ORGANIZED HEALTH CARE EDUCATION/TRAINING PROGRAM

## 2022-12-30 PROCEDURE — 99214 OFFICE O/P EST MOD 30 MIN: CPT | Mod: 95 | Performed by: STUDENT IN AN ORGANIZED HEALTH CARE EDUCATION/TRAINING PROGRAM

## 2022-12-30 RX ORDER — AZELASTINE 1 MG/ML
SPRAY, METERED NASAL
COMMUNITY
Start: 2022-03-18

## 2022-12-30 NOTE — PATIENT INSTRUCTIONS
"Ask for \"Yecenia\" at the . We need to get swabs, labs and chest XR. I will mychart with the results.    In person visit on 1/2/23 at 130pm with myself.  I will mychart with the results.    Viral Respiratory Illness:  -Flonase 1-2x/day  -Rest  -Take advil or tylenol as needed  -Increase fluids; humidity may help  -If you develop sign of allergies (itchy eyes, nose, watery eyes, sneezing) you can try over the counter claritin or zyrtec to see if helps symptoms.  -Can try zinc (zicam or other zinc replacement)-some evidence it shortens a viral illness  -You can try short term (no more than 3 days) use of AFRIN or generic over the counter nasal decongestant.    Dr. Bello  "

## 2022-12-30 NOTE — PROGRESS NOTES
Rico is a 37 year old who is being evaluated via a billable video visit.      How would you like to obtain your AVS? MyChart  If the video visit is dropped, the invitation should be resent by: Text to cell phone: [phone]  Will anyone else be joining your video visit? Yes: girlfriend. How would they like to receive their invitation? Text to cell phone: [phone]    Assessment & Plan     Nasal congestion  Cough, unspecified type  Sore throat  Persistent symptoms since Sept 2022 including nasal congestion, postnasal drip, fatigue, sore throat, cough. Tested negative for covid at home multiple times. Treated with cefdinir Oct 2022 for sinusitis. Due to persistent symptoms will do swabs (COVID, flu, strep), labs (cbc w/diff, crp, cmp, mono), and CXR to rule out pneumonia. Will schedule for in person visit on Monday at 130pm for exam. Continue supportive cares (flonase, tylenol/ibuprofen, mucinex, ect..). Will mychart with results.  - Symptomatic Influenza A/B & SARS-CoV2 (COVID-19) Virus PCR Multiplex; Future  - Comprehensive metabolic panel; Future  - CBC with platelets and differential; Future  - CRP, inflammation; Future  - Streptococcus A Rapid Screen w/Reflex to PCR - Clinic Collect  - XR Chest 2 Views; Future  - Mononucleosis screen; Future  - ESR    Screening for hypercholesterolemia  - Lipid panel; Future      No follow-ups on file.    Ambrocio Bello DO  [facility]    Subjective   Rico is a 37 year old presenting for the following health issues with girlfriend:  Covid Concern (Cough/congestion/fatigue/sore throat/fever)    HPI   Symptoms since Sept 2022  Body aches, sore throat, fatigue, exhaustion, runny nose    On/off  Better for a few days  Then symptoms recur  Sore throat, runny nose, fatigue, body aches  Lots of mucus, post-nasal drip, congestion  Cough productive  Wheezing, fever/chills but 99.8,   Trouble going up down/stair  Ab pain, stomach aches, diarrhea,  nausea from swallowing mucus  Hemorrhoid w/bleeding    Treated with cefdinir 10/25/22 for sinus infection-initially helped  Round of steroids too (4 weeks ago); [facility]  No recent abx      Review of Systems   Constitutional, HEENT, cardiovascular, pulmonary, gi and gu systems are negative, except as otherwise noted.      Objective         Vitals:  No vitals were obtained today due to virtual visit.    Physical Exam   GENERAL: Fatigued  EYES: Eyes grossly normal to inspection.  No discharge or erythema, or obvious scleral/conjunctival abnormalities.  RESP: No audible wheeze, cough, or visible cyanosis.  Nasal congestion. No visible retractions or increased work of breathing.    SKIN: Visible skin clear. No significant rash, abnormal pigmentation or lesions.  NEURO: Cranial nerves grossly intact.  Mentation and speech appropriate for age.  PSYCH: Mentation appears normal, affect normal/bright, judgement and insight intact, normal speech and appearance well-groomed.        Video-Visit Details    Type of service:  Video Visit   Video Start Time: 1:15pm  Video End Time:1:36 PM    Originating Location (pt. Location): Home  Distant Location (provider location):  On-site  Platform used for Video Visit: Delilah

## 2023-01-02 ENCOUNTER — OFFICE VISIT (OUTPATIENT)
Dept: FAMILY MEDICINE | Facility: CLINIC | Age: 38
End: 2023-01-02
Payer: COMMERCIAL

## 2023-01-02 VITALS
RESPIRATION RATE: 15 BRPM | HEIGHT: 72 IN | DIASTOLIC BLOOD PRESSURE: 82 MMHG | SYSTOLIC BLOOD PRESSURE: 122 MMHG | TEMPERATURE: 98.1 F | BODY MASS INDEX: 30.48 KG/M2 | OXYGEN SATURATION: 95 % | HEART RATE: 93 BPM | WEIGHT: 225 LBS

## 2023-01-02 DIAGNOSIS — E78.00 HYPERCHOLESTEREMIA: ICD-10-CM

## 2023-01-02 DIAGNOSIS — J02.9 SORE THROAT: ICD-10-CM

## 2023-01-02 DIAGNOSIS — R09.81 NASAL CONGESTION: Primary | ICD-10-CM

## 2023-01-02 DIAGNOSIS — L51.2 TOXIC EPIDERMAL NECROLYSIS (H): ICD-10-CM

## 2023-01-02 DIAGNOSIS — R05.9 COUGH, UNSPECIFIED TYPE: ICD-10-CM

## 2023-01-02 PROCEDURE — 99214 OFFICE O/P EST MOD 30 MIN: CPT | Performed by: STUDENT IN AN ORGANIZED HEALTH CARE EDUCATION/TRAINING PROGRAM

## 2023-01-02 RX ORDER — ALBUTEROL SULFATE 90 UG/1
2 AEROSOL, METERED RESPIRATORY (INHALATION) EVERY 6 HOURS PRN
Qty: 18 G | Refills: 0 | Status: SHIPPED | OUTPATIENT
Start: 2023-01-02

## 2023-01-02 RX ORDER — LEVOFLOXACIN 750 MG/1
750 TABLET, FILM COATED ORAL DAILY
Qty: 7 TABLET | Refills: 0 | Status: SHIPPED | OUTPATIENT
Start: 2023-01-02 | End: 2023-01-09

## 2023-01-02 RX ORDER — PREDNISONE 20 MG/1
20 TABLET ORAL DAILY
Qty: 5 TABLET | Refills: 0 | Status: SHIPPED | OUTPATIENT
Start: 2023-01-02 | End: 2023-01-07

## 2023-01-02 ASSESSMENT — PATIENT HEALTH QUESTIONNAIRE - PHQ9
SUM OF ALL RESPONSES TO PHQ QUESTIONS 1-9: 8
SUM OF ALL RESPONSES TO PHQ QUESTIONS 1-9: 8
10. IF YOU CHECKED OFF ANY PROBLEMS, HOW DIFFICULT HAVE THESE PROBLEMS MADE IT FOR YOU TO DO YOUR WORK, TAKE CARE OF THINGS AT HOME, OR GET ALONG WITH OTHER PEOPLE: SOMEWHAT DIFFICULT

## 2023-01-02 ASSESSMENT — PAIN SCALES - GENERAL: PAINLEVEL: SEVERE PAIN (6)

## 2023-01-02 NOTE — PROGRESS NOTES
Assessment & Plan     Nasal congestion  Cough, unspecified type  Sore throat  Persistent cough, congestion/runny nose, wheezing, fatigue, sore throat for 4 months. Will improve for a few days, then symptoms recur. He has received doxycycline months ago with short term relief. Vitals WNL. Exam pertinent for diffuse wheezing on exam and nasal congestion. Labs from 12/30 pertinent for elevated CRP. Remainder of labs including cbc, esr, cmp, mono, covid, flu, rsv, and CXR were normal.     Unclear why pt is not recovering. He is otherwise young and healthy. Could be getting repeat resp viral infections. No hx of asthma, however will treat with steroid burst, albuterol prn, and levaquin. If no improvement, will make appt with ID specialist. Continue supportive cares.     - Infectious Disease Referral  - albuterol (PROAIR HFA/PROVENTIL HFA/VENTOLIN HFA) 108 (90 Base) MCG/ACT inhaler  Dispense: 18 g; Refill: 0  - predniSONE (DELTASONE) 20 MG tablet  Dispense: 5 tablet; Refill: 0  - levofloxacin (LEVAQUIN) 750 MG tablet  Dispense: 7 tablet; Refill: 0    Hypercholesteremia  . Total cholesterol >200. Pt will make physical in 1-2 months and will repeat lipid panel when fasting. If LDL persists >180, recommend starting statin. Unable to exercise currently due to the above URI symptoms.    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 Unlikely this is affecting current presentation as he has since recovered. Normal liver/kidney function on recent blood work.       No follow-ups on file.    Ambrocio Bello [facility]    Maria Esther Gómez is a 37 year old accompanied by his girlfriend, presenting for the following health issues:  continuously sick      History of Present Illness       Reason for visit:  Sick  Symptom onset:  More than a month  Symptom intensity:  Moderate  Symptom progression:  Staying the same  Had these  symptoms before:  Yes  Has tried/received treatment for these symptoms:  Yes  Previous treatment was successful:  No    He eats 2-3 servings of fruits and vegetables daily.He consumes 0 sweetened beverage(s) daily.He exercises with enough effort to increase his heart rate 30 to 60 minutes per day.  He exercises with enough effort to increase his heart rate 5 days per week. He is missing 1 dose(s) of medications per week.    Today's PHQ-9         PHQ-9 Total Score: 8    PHQ-9 Q9 Thoughts of better off dead/self-harm past 2 weeks :   Not at all    How difficult have these problems made it for you to do your work, take care of things at home, or get along with other people: Somewhat difficult       URI symptoms  -persistent URI symptoms since Sept 2022 (4 months)  -12/30/22 tests negative for flu, rsv, strep, covid, mono. CXR normal w/o pneumonia.    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        Objective    /82 (BP Location: Left arm, Patient Position: Sitting, Cuff Size: Adult Large)   Pulse 93   Temp 98.1  F (36.7  C) (Temporal)   Resp 15   Ht 1.829 m (6')   Wt 102.1 kg (225 lb)   SpO2 95%   BMI 30.52 kg/m    Body mass index is 30.52 kg/m .       Physical Exam  Constitutional:       Appearance: Normal appearance.   HENT:      Head: Normocephalic and atraumatic.       Right Ear: Tympanic membrane, ear canal and external ear normal.      Left Ear: Tympanic membrane, ear canal and external ear normal.      Nose: Congestion present.      Mouth/Throat:      Mouth: Mucous membranes are moist.      Pharynx: No oropharyngeal exudate or posterior oropharyngeal erythema.   Eyes:      General:         Right eye: No discharge.         Left eye: No discharge.      Extraocular Movements: Extraocular movements intact.      Conjunctiva/sclera: Conjunctivae normal.      Pupils: Pupils are equal, round, and reactive to light.   Cardiovascular:      Rate and Rhythm: Normal rate and regular rhythm.      Heart sounds: Normal heart sounds.   Pulmonary:      Effort: Pulmonary effort is normal. No respiratory distress.      Comments: Diffuse inspiratory and expiratory wheezing  Abdominal:      General: Abdomen is flat. There is no distension.      Palpations: Abdomen is soft.   Musculoskeletal:      Cervical back: Normal range of motion and neck supple. No rigidity.   Lymphadenopathy:      Cervical: No cervical adenopathy.   Skin:     General: Skin is warm and dry.      Findings: No rash.   Neurological:      General: No focal deficit present.      Mental Status: He is alert and oriented to person, place, and time. Mental status is at baseline.      Motor: No weakness.      Gait: Gait normal.   Psychiatric:         Mood and Affect: Mood normal.         Behavior: Behavior normal.         Thought Content: Thought content normal.         Judgment: Judgment normal.

## 2023-01-02 NOTE — PATIENT INSTRUCTIONS
Viral Respiratory Illness:  -Flonase 1-2x/day  -Rest  -Take advil or tylenol as needed  -Increase fluids; humidity may help  -If you develop sign of allergies (itchy eyes, nose, watery eyes, sneezing) you can try over the counter claritin or zyrtec to see if helps symptoms.  -Can try zinc (zicam or other zinc replacement)-some evidence it shortens a viral illness  -You can try short term (no more than 3 days) use of AFRIN or generic over the counter nasal decongestant.    Ways to drain sinuses:  -FOREHEAD: Place fingers on either side of forehead. Massage, working your way outwards towards the temples.  -CHEEKS: Place fingers on cheeks, between the cheek bone and upper jaw. Massage, working your way outwards towards the ears.  -JAW LINE: Place fingers on temples, massage and pull downwards to the tip of the chin.  -NOSE: Find the area between your nasal bone and the corner of your eyes. Hold a firm pressure in that spot with your fingers. Alterate pressure between each side.

## 2023-02-06 PROBLEM — J30.2 SEASONAL ALLERGIC RHINITIS, UNSPECIFIED TRIGGER: Status: ACTIVE | Noted: 2023-02-06

## 2023-02-06 PROBLEM — F32.A ANXIETY AND DEPRESSION: Status: ACTIVE | Noted: 2023-02-06

## 2023-02-06 PROBLEM — M54.41 LUMBAGO WITH SCIATICA, RIGHT SIDE: Status: ACTIVE | Noted: 2023-02-06

## 2023-02-06 PROBLEM — F41.9 ANXIETY AND DEPRESSION: Status: ACTIVE | Noted: 2023-02-06

## 2023-02-28 ENCOUNTER — OFFICE VISIT (OUTPATIENT)
Dept: URGENT CARE | Facility: URGENT CARE | Age: 38
End: 2023-02-28
Payer: COMMERCIAL

## 2023-02-28 VITALS
BODY MASS INDEX: 30.52 KG/M2 | TEMPERATURE: 98.5 F | SYSTOLIC BLOOD PRESSURE: 134 MMHG | DIASTOLIC BLOOD PRESSURE: 89 MMHG | OXYGEN SATURATION: 99 % | HEART RATE: 86 BPM | WEIGHT: 225 LBS

## 2023-02-28 DIAGNOSIS — R05.9 COUGH, UNSPECIFIED TYPE: ICD-10-CM

## 2023-02-28 DIAGNOSIS — R06.2 WHEEZING: ICD-10-CM

## 2023-02-28 DIAGNOSIS — R07.0 THROAT PAIN: Primary | ICD-10-CM

## 2023-02-28 LAB
BASOPHILS # BLD AUTO: 0.1 10E3/UL (ref 0–0.2)
BASOPHILS NFR BLD AUTO: 1 %
DEPRECATED S PYO AG THROAT QL EIA: NEGATIVE
EOSINOPHIL # BLD AUTO: 0.2 10E3/UL (ref 0–0.7)
EOSINOPHIL NFR BLD AUTO: 2 %
ERYTHROCYTE [DISTWIDTH] IN BLOOD BY AUTOMATED COUNT: 12.1 % (ref 10–15)
GROUP A STREP BY PCR: NOT DETECTED
HCT VFR BLD AUTO: 45.7 % (ref 40–53)
HGB BLD-MCNC: 15.4 G/DL (ref 13.3–17.7)
IMM GRANULOCYTES # BLD: 0 10E3/UL
IMM GRANULOCYTES NFR BLD: 0 %
LYMPHOCYTES # BLD AUTO: 3.3 10E3/UL (ref 0.8–5.3)
LYMPHOCYTES NFR BLD AUTO: 36 %
MCH RBC QN AUTO: 29.5 PG (ref 26.5–33)
MCHC RBC AUTO-ENTMCNC: 33.7 G/DL (ref 31.5–36.5)
MCV RBC AUTO: 88 FL (ref 78–100)
MONOCYTES # BLD AUTO: 0.8 10E3/UL (ref 0–1.3)
MONOCYTES NFR BLD AUTO: 8 %
NEUTROPHILS # BLD AUTO: 4.9 10E3/UL (ref 1.6–8.3)
NEUTROPHILS NFR BLD AUTO: 53 %
PLATELET # BLD AUTO: 291 10E3/UL (ref 150–450)
RBC # BLD AUTO: 5.22 10E6/UL (ref 4.4–5.9)
WBC # BLD AUTO: 9.2 10E3/UL (ref 4–11)

## 2023-02-28 PROCEDURE — 87651 STREP A DNA AMP PROBE: CPT | Performed by: FAMILY MEDICINE

## 2023-02-28 PROCEDURE — 85025 COMPLETE CBC W/AUTO DIFF WBC: CPT | Performed by: FAMILY MEDICINE

## 2023-02-28 PROCEDURE — 36415 COLL VENOUS BLD VENIPUNCTURE: CPT | Performed by: FAMILY MEDICINE

## 2023-02-28 PROCEDURE — 99214 OFFICE O/P EST MOD 30 MIN: CPT | Performed by: FAMILY MEDICINE

## 2023-02-28 RX ORDER — PREDNISONE 20 MG/1
20 TABLET ORAL 2 TIMES DAILY
Qty: 10 TABLET | Refills: 0 | Status: SHIPPED | OUTPATIENT
Start: 2023-02-28 | End: 2023-04-24

## 2023-02-28 RX ORDER — CODEINE PHOSPHATE AND GUAIFENESIN 10; 100 MG/5ML; MG/5ML
1-2 SOLUTION ORAL EVERY 4 HOURS PRN
Qty: 100 ML | Refills: 0 | Status: CANCELLED | OUTPATIENT
Start: 2023-02-28

## 2023-02-28 RX ORDER — PREDNISONE 20 MG/1
20 TABLET ORAL 2 TIMES DAILY
Qty: 10 TABLET | Refills: 0 | Status: SHIPPED | OUTPATIENT
Start: 2023-02-28 | End: 2023-02-28

## 2023-02-28 NOTE — PROGRESS NOTES
Chief Complaint   Patient presents with     Urgent Care     Pharyngitis     C/O         sore throat for 8 days                 Rico was seen today for urgent care and pharyngitis.    Diagnoses and all orders for this visit:    Throat pain  -     Streptococcus A Rapid Screen w/Reflex to PCR - Clinic Collect  -     Group A Streptococcus PCR Throat Swab    Cough, unspecified type  -     CBC with platelets and differential; Future  -     CBC with platelets and differential    Wheezing  -     predniSONE (DELTASONE) 20 MG tablet; Take 1 tablet (20 mg) by mouth 2 times daily for 5 days    Suggested to continue doing the albuterol inhaler to help with the wheezing and also do Tessalon Perles which should help with the coughing symptoms.    Results for orders placed or performed in visit on 02/28/23   CBC with platelets and differential     Status: None   Result Value Ref Range    WBC Count 9.2 4.0 - 11.0 10e3/uL    RBC Count 5.22 4.40 - 5.90 10e6/uL    Hemoglobin 15.4 13.3 - 17.7 g/dL    Hematocrit 45.7 40.0 - 53.0 %    MCV 88 78 - 100 fL    MCH 29.5 26.5 - 33.0 pg    MCHC 33.7 31.5 - 36.5 g/dL    RDW 12.1 10.0 - 15.0 %    Platelet Count 291 150 - 450 10e3/uL    % Neutrophils 53 %    % Lymphocytes 36 %    % Monocytes 8 %    % Eosinophils 2 %    % Basophils 1 %    % Immature Granulocytes 0 %    Absolute Neutrophils 4.9 1.6 - 8.3 10e3/uL    Absolute Lymphocytes 3.3 0.8 - 5.3 10e3/uL    Absolute Monocytes 0.8 0.0 - 1.3 10e3/uL    Absolute Eosinophils 0.2 0.0 - 0.7 10e3/uL    Absolute Basophils 0.1 0.0 - 0.2 10e3/uL    Absolute Immature Granulocytes 0.0 <=0.4 10e3/uL   Streptococcus A Rapid Screen w/Reflex to PCR - Clinic Collect     Status: Normal    Specimen: Throat; Swab   Result Value Ref Range    Group A Strep antigen Negative Negative   CBC with platelets and differential     Status: None    Narrative    The following orders were created for panel order CBC with platelets and differential.  Procedure                                Abnormality         Status                     ---------                               -----------         ------                     CBC with platelets and d...[[id number]]                      Final result                 Please view results for these tests on the individual orders.     [facility]  Rico Estrada is a 37 year old male who presents with symptoms consistent with URI. The patient appears well and nontoxic. There is no clinical evidence of dehydration. There is no evidence of any respiratory distress. The patient has normal oxygen saturations with normal work of breathing.  A chest x-ray is not indicated considering no significant tachycardia and no significant tachypnea or respiratory distress, no fevers, no rales on exam, and no hypoxia,  The patient has no significant underlying comorbid cardiopulmonary process including and is not immunocompromised. And at this time I find no indication for antibiotics. I discussed symptomatic treatment including cough meds . He is given scripts for a short course of prednisone  It was discussed that cough and airway hyperreactivity may persist for several weeks. I discussed the need to return for signs of respiratory distress, significant shortness of breath, confusion, if high fevers develop or for any other questions or concerns. Primary clinic follow up in 1 week is recommended for persistent symptoms.  There are no high risk features at this time to suggest any benefit from antibiotics including no history of any significant comorbid cardiac, hepatic, renal, neuromuscular or immunosuppressive conditions.  I did review with patient that as symptoms has been coming on and off with similar symptoms would consider following up with a specialist.  But as CBC is normal and also rapid strep is negative I do not think an antibiotic is needed at this point.    SUBJECTIVE:  Rico Estrada is a 37 year old male who presents to the clinic today with a chief  complaint of cough  and sorethroat  for 9 day(s).  His cough is described as productive of yellow sputum.    The patient's symptoms are moderate and not changing over the course of time.  Associated symptoms include nasal congestion and sore throat. The patient's symptoms are exacerbated by no particular triggers  Patient has been using OTC cough suppressants  to improve symptoms.  Been also noticing wheezing symptoms.  Patient has been having the symptoms on and off for last 2 to 3 months had a complete work-up done and CBC and x-ray were within normal limits.  He had some fever 3 days back also.  Denies any acute shortness of breath but has been having some wheezing symptoms.  Past Medical History:   Diagnosis Date     Allergies      Anxiety      TENS (toxic epidermal necrolysis) (H)     bactrim       Current Outpatient Medications   Medication Sig Dispense Refill     hydrOXYzine (VISTARIL) 50 MG capsule TAKE 1 CAPSULE(50 MG) BY MOUTH THREE TIMES DAILY AS NEEDED 270 capsule 3     albuterol (PROAIR HFA/PROVENTIL HFA/VENTOLIN HFA) 108 (90 Base) MCG/ACT inhaler Inhale 2 puffs into the lungs every 6 hours as needed for shortness of breath, wheezing or cough (Patient not taking: Reported on 2/28/2023) 18 g 0     azelastine (ASTELIN) 0.1 % nasal spray  (Patient not taking: Reported on 12/30/2022)       loratadine (CLARITIN) 10 mg tablet [LORATADINE (CLARITIN) 10 MG TABLET] Take 10 mg by mouth. (Patient not taking: Reported on 12/30/2022)       naproxen (NAPROSYN) 500 MG tablet TAKE 1 TABLET BY MOUTH TWICE DAILY WITH FOOD. DO NOT TAKE WITH IBUPROFEN OR ALEVE for 15 (Patient not taking: Reported on 2/28/2023)       triamcinolone (ARISTOCORT HP) 0.5 % external cream 1 bon applied topically 2 times a day as needed (Patient not taking: Reported on 2/28/2023)       triamcinolone (ARISTOCORT HP) 0.5 % external cream 1 bon (Patient not taking: Reported on 12/30/2022)         Social History     Tobacco Use     Smoking status:  Never     Smokeless tobacco: Never   Substance Use Topics     Alcohol use: Yes     Alcohol/week: 10.0 standard drinks     Comment: Alcoholic Drinks/day: 10 beers all at once but only 1-3 per month       ROS  10 point ROS of systems including Constitutional, Eyes,Cardiovascular, Gastroenterology, Genitourinary, Integumentary, Muscularskeletal, Psychiatric were all negative except for pertinent positives noted in my HPI           OBJECTIVE:  /89   Pulse 86   Temp 98.5  F (36.9  C) (Tympanic)   Wt 102.1 kg (225 lb)   SpO2 99%   BMI 30.52 kg/m    GENERAL APPEARANCE: healthy, alert and no distress  EYES: EOMI,  PERRL, conjunctiva clear  HENT: ear canals and TM's normal.  Nose and mouth without ulcers, erythema or lesions  NECK: supple, nontender, no lymphadenopathy  RESP: lungs good air entry positive for coarse of breath sounds and some wheezing   CV: regular rates and rhythm, normal S1 S2, no murmur noted  PSYCH: mentation appears normal    Elizabeth Lockwood MD

## 2023-04-24 ENCOUNTER — VIRTUAL VISIT (OUTPATIENT)
Dept: FAMILY MEDICINE | Facility: CLINIC | Age: 38
End: 2023-04-24
Payer: COMMERCIAL

## 2023-04-24 DIAGNOSIS — K52.9 GASTROENTERITIS: Primary | ICD-10-CM

## 2023-04-24 PROCEDURE — 99213 OFFICE O/P EST LOW 20 MIN: CPT | Mod: VID | Performed by: FAMILY MEDICINE

## 2023-04-24 RX ORDER — ONDANSETRON 4 MG/1
4 TABLET, ORALLY DISINTEGRATING ORAL EVERY 8 HOURS PRN
Qty: 12 TABLET | Refills: 0 | Status: SHIPPED | OUTPATIENT
Start: 2023-04-24 | End: 2024-08-30

## 2023-04-24 ASSESSMENT — PATIENT HEALTH QUESTIONNAIRE - PHQ9
SUM OF ALL RESPONSES TO PHQ QUESTIONS 1-9: 7
10. IF YOU CHECKED OFF ANY PROBLEMS, HOW DIFFICULT HAVE THESE PROBLEMS MADE IT FOR YOU TO DO YOUR WORK, TAKE CARE OF THINGS AT HOME, OR GET ALONG WITH OTHER PEOPLE: SOMEWHAT DIFFICULT
SUM OF ALL RESPONSES TO PHQ QUESTIONS 1-9: 7

## 2023-04-24 NOTE — PROGRESS NOTES
Rico is a 37 year old who is being evaluated via a billable video visit.      How would you like to obtain your AVS? MyChart  If the video visit is dropped, the invitation should be resent by: Text to cell phone: [phone]  Will anyone else be joining your video visit? No    Assessment & Plan   1. Gastroenteritis: Symptoms resolved currently other than mild nausea.  Likely infectious gastroenteritis given girlfriend symptoms as well.  No current diarrhea.  Very mild nausea we will offer Zofran.  Encourage good handwashing, brat diet.  Work note given. Follow-up as needed if his symptoms worsen.  Patient agreeable with this plan.   - ondansetron (ZOFRAN ODT) 4 MG ODT tab; Take 1 tablet (4 mg) by mouth every 8 hours as needed for nausea  Dispense: 12 tablet; Refill: 0        Arron Toledo MD  [facility]    Disclaimer: This note consists of symbols derived from keyboarding, dictation and/or voice recognition software. As a result, there may be errors in the script that have gone undetected. Please consider this when interpreting information found in this chart.    Subjective   Rico is a 37 year old, presenting for the following health issues:         View : No data to display.              HPI     Last Tuesday, midday, felt pail, nausea, and hademesis, face also got swollen for 2 days. Later had Diarrhea Tuesday night and on Saturday. Symptoms resolved now. No recent travel, antibiotic use. Girlfriend has also been ill with a GI bug, but more mild    Works as a banker.     Needs note to return to work.    Review of Systems   Constitutional, HEENT, cardiovascular, pulmonary, gi and gu systems are negative, except as otherwise noted.      Objective       Vitals:  No vitals were obtained today due to virtual visit.    Physical Exam   GENERAL: Healthy, alert and no distress  EYES: Eyes grossly normal to inspection.  No discharge or erythema, or obvious scleral/conjunctival  abnormalities.  RESP: No audible wheeze, cough, or visible cyanosis.  No visible retractions or increased work of breathing.    SKIN: Visible skin clear. No significant rash, abnormal pigmentation or lesions.  NEURO: Cranial nerves grossly intact.  Mentation and speech appropriate for age.  PSYCH: Mentation appears normal, affect normal/bright, judgement and insight intact, normal speech and appearance well-groomed.    Labs: none        Video-Visit Details    Type of service:  Video Visit   Video Start Time: 4:41 PM  Video End Time:4:47 PM    Originating Location (pt. Location): Home    Distant Location (provider location):  On-site  Platform used for Video Visit: Delilah

## 2023-04-24 NOTE — LETTER
April 24, 2023      Rico Estrada  [address] [place] [address]        To Whom It May Concern:    Rico Estrada was seen in our clinic. He may return to work without restrictions as of 4/25/23.    Sincerely,        Arron Toledo MD

## 2023-04-27 ENCOUNTER — E-VISIT (OUTPATIENT)
Dept: URGENT CARE | Facility: CLINIC | Age: 38
End: 2023-04-27
Payer: COMMERCIAL

## 2023-04-27 DIAGNOSIS — R19.7 DIARRHEA, UNSPECIFIED TYPE: Primary | ICD-10-CM

## 2023-04-27 PROCEDURE — 99207 PR NON-BILLABLE SERV PER CHARTING: CPT | Performed by: FAMILY MEDICINE

## 2023-04-27 NOTE — PATIENT INSTRUCTIONS
Dear Rico Estrada,    We are sorry you are not feeling well. Based on the responses you provided, it is recommended that you be seen in-person in urgent care so we can better evaluate your symptoms. Please click here to find the nearest urgent care location to you.   You will not be charged for this Visit. Thank you for trusting us with your care.    Jenny Ambrose MD

## 2023-04-28 ENCOUNTER — E-VISIT (OUTPATIENT)
Dept: URGENT CARE | Facility: CLINIC | Age: 38
End: 2023-04-28
Payer: COMMERCIAL

## 2023-04-28 DIAGNOSIS — R19.7 DIARRHEA, UNSPECIFIED TYPE: Primary | ICD-10-CM

## 2023-04-28 PROCEDURE — 99207 PR NON-BILLABLE SERV PER CHARTING: CPT | Performed by: NURSE PRACTITIONER

## 2023-04-28 NOTE — PATIENT INSTRUCTIONS
Dear Rico Estrada,    We are sorry you are not feeling well. Based on the responses you provided, it is recommended that you be seen in-person in urgent care so we can better evaluate your symptoms. Please click here to find the nearest urgent care location to you.   You will not be charged for this Visit. Thank you for trusting us with your care.    MELITA Granda CNP      Diarrhea with Uncertain Cause (Adult)    Diarrhea is when stools are loose and watery. This can be caused by:     Viral infections    Bacterial infections    Food poisoning    Parasites    Irritable bowel syndrome (IBS)    Inflammatory bowel diseases such as ulcerative colitis, Crohn's disease, and celiac disease    Food intolerance, such as to lactose, the sugar found in milk and milk products    Reaction to medicines like antibiotics, laxatives, cancer medicines, and antacids  Along with diarrhea, you may also have:    Abdominal pain and cramping    Nausea and vomiting    Loss of bowel control    Fever and chills    Bloody stools  In some cases, antibiotics may help to treat diarrhea. You may have a stool sample test which is done to see what is causing your diarrhea, and if antibiotics will help treat it. The results of a stool sample test may take up to 2 days. The healthcare provider may not give you antibiotics until they have the stool test results.   Diarrhea can cause dehydration. This is the loss of too much water and other fluids from the body. When this occurs, you must replace those body fluids. This can be done with oral rehydration solutions. Oral rehydration solutions are available at drugstores and grocery stores without a prescription. Sports drinks are not the best choice if you are very dehydrated. They usually have too much sugar and not enough electrolytes.   Home care  Follow all instructions given by your healthcare provider. Rest at home for the next 24 hours, or until you feel better. Avoid caffeine,  tobacco, and alcohol. These can make diarrhea, cramping, and pain worse.   If taking medicines:    Over-the-counter nausea and diarrhea medicines are generally OK unless you experience fever or blood in the stool. Check with your healthcare provider first in those circumstances.    You may use acetaminophen or nonsteroidal anti-inflammatory drugs (NSAIDs) such as ibuprofen or naproxen to reduce pain and fever. Don t use these if you have chronic liver or kidney disease, or ever had a stomach ulcer or gastrointestinal bleeding. Don't use NSAID medicines if you are already taking one for another condition (like arthritis) or are on daily aspirin therapy (such as for heart disease or after a stroke). Talk with your healthcare provider first.    If antibiotics were prescribed, be sure you take them until they are finished. Don t stop taking them even when you feel better. Antibiotics must be taken exactly as prescribed.  To prevent the spread of illness:    Remember that washing with soap and clean, running water and using alcohol-based  is the best way to prevent the spread of infection. Dry your hands with a single-use towel (like a paper towel).    Clean the toilet after each use.    Wash your hands before eating.    Wash your hands before and after preparing food. Keep in mind that people with diarrhea or vomiting should not prepare food for others.    Wash your hands after using cutting boards, counter tops, and knives that have been in contact with raw foods.    Wash and then peel fruits and vegetables.    Keep uncooked meats away from cooked and ready-to-eat foods.    Use a food thermometer when cooking. Cook poultry to at least 165 F (74 C). Cook ground meat (beef, veal, pork, lamb) to at least 160 F (71 C). Cook fresh beef, veal, lamb, and pork to at least 145 F (63 C).    Don t eat raw or undercooked eggs (poached or aly side up), poultry, meat, or unpasteurized milk and juices.  Food and  drinks  The main goal while treating vomiting or diarrhea is to prevent dehydration. This is done by taking small amounts of liquids often.     Keep in mind that liquids are more important than food right now.    Drink only small amounts of liquids at a time.    Don t force yourself to eat, especially if you are having cramping, vomiting, or diarrhea. Don t eat large amounts at a time, even if you are hungry.    If you eat, avoid fatty, greasy, spicy, or fried foods.    Don t eat dairy foods or drink milk if you have diarrhea. These can make diarrhea worse.  During the first 24 hours you can try:    Oral rehydration solutions.  Sports drinks may be used if you are not too dehydrated and are otherwise healthy.    Soft drinks without caffeine    Ginger ale    Water (plain or flavored)    Decaf tea or coffee    Clear broth, consommé, or bouillon    Gelatin, ice pops, or frozen fruit juice bars  The second 24 hours, if you are feeling better, you can add:    Hot cereal, plain toast, bread, rolls, or crackers    Plain noodles, rice, mashed potatoes, chicken noodle soup, or rice soup    Applesauce, unsweetened canned fruit (no pineapple)    Bananas  As you recover:    Limit fat intake to less than 15 grams per day. Don t eat margarine, butter, oils, mayonnaise, sauces, gravies, fried foods, peanut butter, meat, poultry, or fish.    Limit fiber. Don t eat raw or cooked vegetables, fresh fruits except bananas, or bran cereals.    Limit caffeine and chocolate.    Limit dairy.    Don t use spices or seasonings except salt.    Go back to your normal diet over time, as you feel better and your symptoms improve.    If the symptoms come back, go back to a simple diet or clear liquids.  Follow-up care  Follow up with your healthcare provider, or as advised. If a stool sample was taken or cultures were done, call the healthcare provider for the results as instructed.   Call 911  Call 911 if you have any of these symptoms:      Trouble breathing    Confusion    Extreme drowsiness or trouble walking    Loss of consciousness    Rapid heart rate    Chest pain    Stiff neck    Seizure  When to get medical advice  Call your healthcare provider right away if any of these occur:     Abdominal pain that gets worse    Constant lower right abdominal pain    Continued vomiting and inability to keep liquids down    Diarrhea more than 5 times a day    Blood in vomit or stool    Dark urine or no urine for 8 hours, dry mouth and tongue, tiredness, weakness, or dizziness    Drowsiness    New rash    You don t get better in 2 to 3 days    Fever of 100.4 F (38 C) or higher, or as advised by your provider  [employer] last reviewed this educational content on 2/1/2022 2000-2022 [employer]. All rights reserved. This information is not intended as a substitute for professional medical care. Always follow your healthcare professional's instructions.

## 2023-04-29 ENCOUNTER — OFFICE VISIT (OUTPATIENT)
Dept: URGENT CARE | Facility: URGENT CARE | Age: 38
End: 2023-04-29
Payer: COMMERCIAL

## 2023-04-29 VITALS
SYSTOLIC BLOOD PRESSURE: 113 MMHG | OXYGEN SATURATION: 97 % | TEMPERATURE: 98.8 F | WEIGHT: 225 LBS | BODY MASS INDEX: 30.52 KG/M2 | DIASTOLIC BLOOD PRESSURE: 81 MMHG | HEART RATE: 91 BPM

## 2023-04-29 DIAGNOSIS — K52.9 GASTROENTERITIS: Primary | ICD-10-CM

## 2023-04-29 PROCEDURE — 99213 OFFICE O/P EST LOW 20 MIN: CPT | Performed by: FAMILY MEDICINE

## 2023-04-29 NOTE — PROGRESS NOTES
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   Does not appear dehydrated at this time, symptoms have mostly resolved. Follow-up as needed    Anthony Umana MD   [place] [facility]    Maria Esther Gómez is a 37 year old male who presents to clinic today for the following health issues:  Chief Complaint   Patient presents with     Urgent Care     Nausea     C/O nausea for 12 days      HPI  Symptoms lasted for about a week with nausea and loose stools -- symptoms have resolved and is returning to normal diet. WIth his new job now working in person as a  they are asking for paperwork documenting this illness given he just started his position and has not accrued PTO -- they did not allow him to return to work 2 days ago until cleared by Doctor. He does not have a primary care physician  Denies blood in his stools.     4/24/23 virtual visit -- presumed to have gastroenteritis at that time his symptoms had been rpesent      Absent of fevers  No recent travel  Denies recent use of antibiotics    Girlfriend had illness with stomach flu type symptoms but more mild than his.     Patient Active Problem List    Diagnosis Date Noted     Anxiety and depression 02/06/2023     Priority: Medium     Lumbago with sciatica, right side 02/06/2023     Priority: Medium     Seasonal allergic rhinitis, unspecified trigger 02/06/2023     Priority: Medium     Hypercholesteremia 01/02/2023     Priority: Medium     Anxiety 02/11/2022     Priority: Medium     Chronic pain 02/11/2022     Priority: Medium     Low back pain 02/11/2022     Priority: Medium     Moderate recurrent major depression (H) 02/11/2022     Priority: Medium     Sciatica 02/11/2022     Priority: Medium     Other male erectile dysfunction 11/19/2020     Priority: Medium     Hyperglycemia 10/30/2020     Priority: Medium     Hyponatremia 10/30/2020     Priority: Medium     Tachycardia  10/30/2020     Priority: Medium     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           Objective    /81   Pulse 91   Temp 98.8  F (37.1  C) (Tympanic)   Wt 102.1 kg (225 lb)   SpO2 97%   BMI 30.52 kg/m    Physical Exam     GEN: NAD, moist membranes  CV: HDS  Pulm: non-labored  GaitL normal    No results found for any visits on 04/29/23.                  The use of Dragon/Peak[employer] dictation services may have been used to construct the content in this note; any grammatical or spelling errors are non-intentional. Please contact the author of this note directly if you are in need of any clarification.

## 2023-05-14 ENCOUNTER — HEALTH MAINTENANCE LETTER (OUTPATIENT)
Age: 38
End: 2023-05-14

## 2023-09-26 ENCOUNTER — TRANSFERRED RECORDS (OUTPATIENT)
Dept: HEALTH INFORMATION MANAGEMENT | Facility: CLINIC | Age: 38
End: 2023-09-26

## 2024-07-21 ENCOUNTER — HEALTH MAINTENANCE LETTER (OUTPATIENT)
Age: 39
End: 2024-07-21

## 2024-08-30 ENCOUNTER — VIRTUAL VISIT (OUTPATIENT)
Dept: FAMILY MEDICINE | Facility: CLINIC | Age: 39
End: 2024-08-30
Payer: COMMERCIAL

## 2024-08-30 DIAGNOSIS — J32.1 FRONTAL SINUSITIS, UNSPECIFIED CHRONICITY: Primary | ICD-10-CM

## 2024-08-30 PROCEDURE — 99214 OFFICE O/P EST MOD 30 MIN: CPT | Mod: 95 | Performed by: PHYSICIAN ASSISTANT

## 2024-08-30 RX ORDER — CEFDINIR 300 MG/1
300 CAPSULE ORAL 2 TIMES DAILY
COMMUNITY
Start: 2024-08-27 | End: 2024-09-06

## 2024-08-30 RX ORDER — PREDNISONE 20 MG/1
40 TABLET ORAL DAILY
Qty: 10 TABLET | Refills: 0 | Status: SHIPPED | OUTPATIENT
Start: 2024-08-30 | End: 2024-09-04

## 2024-08-30 RX ORDER — BUSPIRONE HYDROCHLORIDE 7.5 MG/1
7.5 TABLET ORAL 3 TIMES DAILY
COMMUNITY

## 2024-08-30 NOTE — LETTER
August 30, 2024      Rico Estrada  [address] [place] [address]        To Whom It May Concern:    Rico Estrada was seen in our clinic on 8/30/2024 for illness.       Sincerely,      Dania Moreira PA-C

## 2024-08-30 NOTE — PROGRESS NOTES
Rico is a 38 year old who is being evaluated via a billable video visit.          Assessment & Plan     Frontal sinusitis, unspecified chronicity  On omnicef x 3 days prescribed for sinusitis by [facility] clinic.  Has done well with prednisone in the past for sinusitis, will add prednisone  - predniSONE (DELTASONE) 20 MG tablet  Dispense: 10 tablet; Refill: 0              Subjective   Rico is a 38 year old, presenting for the following health issues:  No chief complaint on file.      Video Start Time:  8:42 am    HPI     Sinus congestion, pressure, headaches x 10 days.  He was seen at [facility] clinic 3 days ago and started on omnicef.  He is using flonase, claritin d for allergy and congestion. He has not felt improvement from the antibiotics yet but is not feeling worsen He has had prednisone in the past that has helped with sinusitis as well for him.  He is getting  next weekend and would like to be well for the wedding.  Denies fevers, chills or body aches.       Objective           Vitals:  No vitals were obtained today due to virtual visit.    Physical Exam   GENERAL: alert and no distress  EYES: Eyes grossly normal to inspection.  No discharge or erythema, or obvious scleral/conjunctival abnormalities.  RESP: No audible wheeze, cough, or visible cyanosis.    SKIN: Visible skin clear. No significant rash, abnormal pigmentation or lesions.  NEURO: Cranial nerves grossly intact.  Mentation and speech appropriate for age.  PSYCH: Appropriate affect, tone, and pace of words          Video-Visit Details    Type of service:  Video Visit   Video End Time:8:55 AM  Originating Location (pt. Location): Home    Distant Location (provider location):  On-site  Platform used for Video Visit: Delilah  Signed Electronically by: Dania Moreira PA-C

## 2024-11-25 ENCOUNTER — OFFICE VISIT (OUTPATIENT)
Dept: FAMILY MEDICINE | Facility: CLINIC | Age: 39
End: 2024-11-25
Payer: COMMERCIAL

## 2024-11-25 VITALS
BODY MASS INDEX: 32.2 KG/M2 | OXYGEN SATURATION: 98 % | SYSTOLIC BLOOD PRESSURE: 112 MMHG | HEART RATE: 99 BPM | WEIGHT: 230 LBS | DIASTOLIC BLOOD PRESSURE: 75 MMHG | RESPIRATION RATE: 16 BRPM | TEMPERATURE: 97.6 F | HEIGHT: 71 IN

## 2024-11-25 DIAGNOSIS — R23.8 SKIN SENSITIVITY: ICD-10-CM

## 2024-11-25 DIAGNOSIS — Z13.220 SCREENING FOR LIPID DISORDERS: ICD-10-CM

## 2024-11-25 DIAGNOSIS — F33.1 MODERATE RECURRENT MAJOR DEPRESSION (H): ICD-10-CM

## 2024-11-25 DIAGNOSIS — L29.9 PRURITUS: ICD-10-CM

## 2024-11-25 DIAGNOSIS — Z13.1 SCREENING FOR DIABETES MELLITUS: ICD-10-CM

## 2024-11-25 DIAGNOSIS — F41.9 ANXIETY: ICD-10-CM

## 2024-11-25 DIAGNOSIS — Z86.19 FREQUENT INFECTIONS: ICD-10-CM

## 2024-11-25 DIAGNOSIS — Z00.00 ROUTINE GENERAL MEDICAL EXAMINATION AT A HEALTH CARE FACILITY: Primary | ICD-10-CM

## 2024-11-25 PROBLEM — R73.9 HYPERGLYCEMIA: Status: RESOLVED | Noted: 2020-10-30 | Resolved: 2024-11-25

## 2024-11-25 PROBLEM — M54.30 SCIATICA: Status: RESOLVED | Noted: 2022-02-11 | Resolved: 2024-11-25

## 2024-11-25 PROBLEM — Z87.2 HISTORY OF STEVENS-JOHNSON TOXIC EPIDERMAL NECROLYSIS OVERLAP SYNDROME: Status: ACTIVE | Noted: 2024-11-25

## 2024-11-25 PROBLEM — E87.1 HYPONATREMIA: Status: RESOLVED | Noted: 2020-10-30 | Resolved: 2024-11-25

## 2024-11-25 PROBLEM — R00.0 TACHYCARDIA: Status: RESOLVED | Noted: 2020-10-30 | Resolved: 2024-11-25

## 2024-11-25 PROBLEM — L51.2: Status: RESOLVED | Noted: 2020-10-28 | Resolved: 2024-11-25

## 2024-11-25 LAB
BASOPHILS # BLD AUTO: 0 10E3/UL (ref 0–0.2)
BASOPHILS NFR BLD AUTO: 0 %
EOSINOPHIL # BLD AUTO: 0.2 10E3/UL (ref 0–0.7)
EOSINOPHIL NFR BLD AUTO: 2 %
ERYTHROCYTE [DISTWIDTH] IN BLOOD BY AUTOMATED COUNT: 12 % (ref 10–15)
ERYTHROCYTE [SEDIMENTATION RATE] IN BLOOD BY WESTERGREN METHOD: 7 MM/HR (ref 0–15)
EST. AVERAGE GLUCOSE BLD GHB EST-MCNC: 114 MG/DL
HBA1C MFR BLD: 5.6 % (ref 0–5.6)
HCT VFR BLD AUTO: 42.4 % (ref 40–53)
HGB BLD-MCNC: 14 G/DL (ref 13.3–17.7)
IMM GRANULOCYTES # BLD: 0 10E3/UL
IMM GRANULOCYTES NFR BLD: 0 %
LYMPHOCYTES # BLD AUTO: 2.8 10E3/UL (ref 0.8–5.3)
LYMPHOCYTES NFR BLD AUTO: 38 %
MCH RBC QN AUTO: 29.9 PG (ref 26.5–33)
MCHC RBC AUTO-ENTMCNC: 33 G/DL (ref 31.5–36.5)
MCV RBC AUTO: 90 FL (ref 78–100)
MONOCYTES # BLD AUTO: 0.7 10E3/UL (ref 0–1.3)
MONOCYTES NFR BLD AUTO: 9 %
NEUTROPHILS # BLD AUTO: 3.7 10E3/UL (ref 1.6–8.3)
NEUTROPHILS NFR BLD AUTO: 50 %
PLATELET # BLD AUTO: 287 10E3/UL (ref 150–450)
RBC # BLD AUTO: 4.69 10E6/UL (ref 4.4–5.9)
RETICS # AUTO: 0.07 10E6/UL (ref 0.03–0.1)
RETICS/RBC NFR AUTO: 1.5 % (ref 0.5–2)
WBC # BLD AUTO: 7.3 10E3/UL (ref 4–11)

## 2024-11-25 PROCEDURE — 99395 PREV VISIT EST AGE 18-39: CPT | Mod: 25 | Performed by: STUDENT IN AN ORGANIZED HEALTH CARE EDUCATION/TRAINING PROGRAM

## 2024-11-25 PROCEDURE — 90471 IMMUNIZATION ADMIN: CPT | Performed by: STUDENT IN AN ORGANIZED HEALTH CARE EDUCATION/TRAINING PROGRAM

## 2024-11-25 PROCEDURE — 85045 AUTOMATED RETICULOCYTE COUNT: CPT | Performed by: STUDENT IN AN ORGANIZED HEALTH CARE EDUCATION/TRAINING PROGRAM

## 2024-11-25 PROCEDURE — 86038 ANTINUCLEAR ANTIBODIES: CPT | Performed by: STUDENT IN AN ORGANIZED HEALTH CARE EDUCATION/TRAINING PROGRAM

## 2024-11-25 PROCEDURE — 90746 HEPB VACCINE 3 DOSE ADULT IM: CPT | Performed by: STUDENT IN AN ORGANIZED HEALTH CARE EDUCATION/TRAINING PROGRAM

## 2024-11-25 PROCEDURE — 99207 BLOOD MORPHOLOGY PATHOLOGIST REVIEW: CPT | Performed by: PATHOLOGY

## 2024-11-25 PROCEDURE — 84443 ASSAY THYROID STIM HORMONE: CPT | Performed by: STUDENT IN AN ORGANIZED HEALTH CARE EDUCATION/TRAINING PROGRAM

## 2024-11-25 PROCEDURE — 86039 ANTINUCLEAR ANTIBODIES (ANA): CPT | Performed by: STUDENT IN AN ORGANIZED HEALTH CARE EDUCATION/TRAINING PROGRAM

## 2024-11-25 PROCEDURE — 85652 RBC SED RATE AUTOMATED: CPT | Performed by: STUDENT IN AN ORGANIZED HEALTH CARE EDUCATION/TRAINING PROGRAM

## 2024-11-25 PROCEDURE — 80061 LIPID PANEL: CPT | Performed by: STUDENT IN AN ORGANIZED HEALTH CARE EDUCATION/TRAINING PROGRAM

## 2024-11-25 PROCEDURE — 83036 HEMOGLOBIN GLYCOSYLATED A1C: CPT | Performed by: STUDENT IN AN ORGANIZED HEALTH CARE EDUCATION/TRAINING PROGRAM

## 2024-11-25 PROCEDURE — 99214 OFFICE O/P EST MOD 30 MIN: CPT | Mod: 25 | Performed by: STUDENT IN AN ORGANIZED HEALTH CARE EDUCATION/TRAINING PROGRAM

## 2024-11-25 PROCEDURE — 86140 C-REACTIVE PROTEIN: CPT | Performed by: STUDENT IN AN ORGANIZED HEALTH CARE EDUCATION/TRAINING PROGRAM

## 2024-11-25 PROCEDURE — 36415 COLL VENOUS BLD VENIPUNCTURE: CPT | Performed by: STUDENT IN AN ORGANIZED HEALTH CARE EDUCATION/TRAINING PROGRAM

## 2024-11-25 PROCEDURE — 85025 COMPLETE CBC W/AUTO DIFF WBC: CPT | Performed by: STUDENT IN AN ORGANIZED HEALTH CARE EDUCATION/TRAINING PROGRAM

## 2024-11-25 RX ORDER — FLUOXETINE 10 MG/1
20 CAPSULE ORAL DAILY
Qty: 90 CAPSULE | Refills: 0 | Status: SHIPPED | OUTPATIENT
Start: 2024-11-25 | End: 2024-11-25

## 2024-11-25 RX ORDER — BUSPIRONE HYDROCHLORIDE 7.5 MG/1
7.5 TABLET ORAL 3 TIMES DAILY
Qty: 270 TABLET | Refills: 4 | Status: SHIPPED | OUTPATIENT
Start: 2024-11-25

## 2024-11-25 RX ORDER — HYDROXYZINE PAMOATE 50 MG/1
50 CAPSULE ORAL 3 TIMES DAILY PRN
Qty: 90 CAPSULE | Refills: 3 | Status: SHIPPED | OUTPATIENT
Start: 2024-11-25

## 2024-11-25 SDOH — HEALTH STABILITY: PHYSICAL HEALTH: ON AVERAGE, HOW MANY DAYS PER WEEK DO YOU ENGAGE IN MODERATE TO STRENUOUS EXERCISE (LIKE A BRISK WALK)?: 3 DAYS

## 2024-11-25 ASSESSMENT — PATIENT HEALTH QUESTIONNAIRE - PHQ9
SUM OF ALL RESPONSES TO PHQ QUESTIONS 1-9: 8
10. IF YOU CHECKED OFF ANY PROBLEMS, HOW DIFFICULT HAVE THESE PROBLEMS MADE IT FOR YOU TO DO YOUR WORK, TAKE CARE OF THINGS AT HOME, OR GET ALONG WITH OTHER PEOPLE: SOMEWHAT DIFFICULT
SUM OF ALL RESPONSES TO PHQ QUESTIONS 1-9: 8

## 2024-11-25 ASSESSMENT — ANXIETY QUESTIONNAIRES
2. NOT BEING ABLE TO STOP OR CONTROL WORRYING: SEVERAL DAYS
GAD7 TOTAL SCORE: 6
7. FEELING AFRAID AS IF SOMETHING AWFUL MIGHT HAPPEN: NOT AT ALL
6. BECOMING EASILY ANNOYED OR IRRITABLE: SEVERAL DAYS
4. TROUBLE RELAXING: SEVERAL DAYS
1. FEELING NERVOUS, ANXIOUS, OR ON EDGE: SEVERAL DAYS
8. IF YOU CHECKED OFF ANY PROBLEMS, HOW DIFFICULT HAVE THESE MADE IT FOR YOU TO DO YOUR WORK, TAKE CARE OF THINGS AT HOME, OR GET ALONG WITH OTHER PEOPLE?: SOMEWHAT DIFFICULT
IF YOU CHECKED OFF ANY PROBLEMS ON THIS QUESTIONNAIRE, HOW DIFFICULT HAVE THESE PROBLEMS MADE IT FOR YOU TO DO YOUR WORK, TAKE CARE OF THINGS AT HOME, OR GET ALONG WITH OTHER PEOPLE: SOMEWHAT DIFFICULT
7. FEELING AFRAID AS IF SOMETHING AWFUL MIGHT HAPPEN: NOT AT ALL
3. WORRYING TOO MUCH ABOUT DIFFERENT THINGS: SEVERAL DAYS
GAD7 TOTAL SCORE: 6
GAD7 TOTAL SCORE: 6
5. BEING SO RESTLESS THAT IT IS HARD TO SIT STILL: SEVERAL DAYS

## 2024-11-25 ASSESSMENT — PAIN SCALES - GENERAL: PAINLEVEL_OUTOF10: NO PAIN (0)

## 2024-11-25 ASSESSMENT — SOCIAL DETERMINANTS OF HEALTH (SDOH): HOW OFTEN DO YOU GET TOGETHER WITH FRIENDS OR RELATIVES?: ONCE A WEEK

## 2024-11-25 NOTE — PROGRESS NOTES
Preventive Care Visit  [facility]  Ambrocio Bello DO, Family Medicine  Nov 25, 2024      Assessment & Plan     Routine general medical examination at a health care facility  -Vitals: WNL  -Labs: see below  -Immunizations: hep b  -Exercise: routine, gets tired easily  -Diet: ok  -alcohol use: excess use at times 2-3x/month, counseled on reducing use    Screening for lipid disorders  - Lipid panel    Screening for diabetes mellitus  - Hemoglobin A1c    Pruritus  - hydrOXYzine (VISTARIL) 50 MG capsule  Dispense: 90 capsule; Refill: 3    Frequent infections  Pt still having frequent URI, every 1-2 months. No symptoms today. Endorses chronic fatigue as well. 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 Has never had abnormal labs other than mildly elevated CRP.  He has had sinus and nasal surgery in the past, previously seen by [facility].    Plan:  -labs below  -referral to immunology  -consider ID referral if no improvement  -consider CT of sinuses to evaluate for chronic sinusitis    - Immunology Referral  - CBC with platelets and differential  - Anti Nuclear Tiffany IgG by IFA with Reflex  - CRP, inflammation  - ESR: Erythrocyte sedimentation rate  - TSH with free T4 reflex    Anxiety  MDD  Pt interested in restarting prozac, was on 40mg in the past.     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 This is affecting his sex life. Denies rashes/skin lesions. Sounds like nerve hypersensitivity. Offered gabapentin or lyrica-he will think about it. Advised to see dermatology. I am unsure of what long term complications arise from avila azra syndrome.      BMI  Estimated body mass index is 32.08 kg/m  as calculated  "from the following:    Height as of this encounter: 1.803 m (5' 11\").    Weight as of this encounter: 104.3 kg (230 lb).   Weight management plan: Discussed healthy diet and exercise guidelines    Counseling  Appropriate preventive services were addressed with this patient via screening, questionnaire, or discussion as appropriate for fall prevention, nutrition, physical activity, Tobacco-use cessation, social engagement, weight loss and cognition.  Checklist reviewing preventive services available has been given to the patient.  Reviewed patient's diet, addressing concerns and/or questions.   He is at risk for lack of exercise and has been provided with information to increase physical activity for the benefit of his well-being.   He is at risk for psychosocial distress and has been provided with information to reduce risk.   The patient reports drinking more than 3 alcoholic drinks per day and/or more than 7 drhnks per week. The patient was counseled and given information about possible harmful effects of excessive alcohol intake.The patient's PHQ-9 score is consistent with mild depression. He was provided with information regarding depression.           Maria Esther Gómez is a 39 year old, presenting for the following:  Physical and Derm Problem (Wart on Finger )        11/25/2024     2:14 PM   Additional Questions   Roomed by Naomie LONDONO    Diet-ok  Exercise-ok    MDD, anxiety  -buspar 7.5mg TID  -hydroxyzine PRN  -used to be on prozac wants to restart    HLD  -need to repeat lipids    Alcohol use  -high audit score  -use 2-3x/month    Sick a lot  Every few weeks  Usually fever, body aches, fatigue          1/2/2023     1:16 PM 4/24/2023     4:35 PM 11/25/2024     1:55 PM   PHQ   PHQ-9 Total Score 8 7 8    Q9: Thoughts of better off dead/self-harm past 2 weeks Not at all  Not at all  Not at all        Patient-reported         11/25/2024     1:55 PM   GAUTAM-7 SCORE   Total Score 6 (mild anxiety) "   Total Score 6        Patient-reported         Health Care Directive  Patient does not have a Health Care Directive: Discussed advance care planning with patient; information given to patient to review.      11/25/2024   General Health   How would you rate your overall physical health? (!) FAIR   Feel stress (tense, anxious, or unable to sleep) To some extent      (!) STRESS CONCERN      11/25/2024   Nutrition   Three or more servings of calcium each day? Yes   Diet: Regular (no restrictions)   How many servings of fruit and vegetables per day? (!) 2-3   How many sweetened beverages each day? 0-1            11/25/2024   Exercise   Days per week of moderate/strenous exercise 3 days            11/25/2024   Social Factors   Frequency of gathering with friends or relatives Once a week   Worry food won't last until get money to buy more No   Food not last or not have enough money for food? No   Do you have housing? (Housing is defined as stable permanent housing and does not include staying ouside in a car, in a tent, in an abandoned building, in an overnight shelter, or couch-surfing.) Yes   Are you worried about losing your housing? No   Lack of transportation? No   Unable to get utilities (heat,electricity)? No            11/25/2024   Dental   Dentist two times every year? Yes            11/25/2024   TB Screening   Were you born outside of the [place]? No        Today's PHQ-9 Score:       11/25/2024     1:55 PM   PHQ-9 SCORE   PHQ-9 Total Score MyChart 8 (Mild depression)   PHQ-9 Total Score 8        Patient-reported         11/25/2024   Substance Use   Alcohol more than 3/day or more than 7/wk Yes   How often do you have a drink containing alcohol 2 to 4 times a month   How many alcohol drinks on typical day 5 or 6   How often do you have 5+ drinks at one occasion Monthly   Audit 2/3 Score 4   How often not able to stop drinking once started Less than monthly   How often failed to do what normally expected Less than  "monthly   How often needed first drink in am after a heavy drinking session Less than monthly   How often feeling of guilt or remorse after drinking Less than monthly   How often unable to remember what happened the night before Less than monthly   Have you or someone else been injured because of your drinking No   Has anyone been concerned or suggested you cut down on drinking Yes, but not in the last year   TOTAL SCORE - AUDIT 13   Do you use any other substances recreationally? (!) CANNABIS PRODUCTS        Social History     Tobacco Use    Smoking status: Never     Passive exposure: Never    Smokeless tobacco: Never   Vaping Use    Vaping status: Never Used   Substance Use Topics    Alcohol use: Yes     Alcohol/week: 10.0 standard drinks of alcohol     Comment: Alcoholic Drinks/day: 10 beers all at once but only 1-3 per month    Drug use: Yes     Types: Marijuana         11/25/2024   STI Screening   New sexual partner(s) since last STI/HIV test? (!) YES             11/25/2024   Contraception/Family Planning   Questions about contraception or family planning No        Reviewed and updated as needed this visit by Provider   Tobacco   Meds  Problems  Med Hx  Surg Hx  Fam Hx  Soc Hx Sexual   Activity             Objective    Exam  /75 (BP Location: Right arm, Patient Position: Sitting, Cuff Size: Adult Large)   Pulse 99   Temp 97.6  F (36.4  C) (Temporal)   Resp 16   Ht 1.803 m (5' 11\")   Wt 104.3 kg (230 lb)   SpO2 98%   BMI 32.08 kg/m     Estimated body mass index is 32.08 kg/m  as calculated from the following:    Height as of this encounter: 1.803 m (5' 11\").    Weight as of this encounter: 104.3 kg (230 lb).    Physical Exam  Constitutional:       General: He is not in acute distress.     Appearance: He is well-developed.   HENT:      Head: Normocephalic and atraumatic.      Nose: Nose normal.      Mouth/Throat:      Mouth: Mucous membranes are moist.      Pharynx: Oropharynx is clear. No " oropharyngeal exudate.   Eyes:      Conjunctiva/sclera: Conjunctivae normal.      Pupils: Pupils are equal, round, and reactive to light.   Neck:      Thyroid: No thyroid mass, thyromegaly or thyroid tenderness.   Cardiovascular:      Rate and Rhythm: Normal rate and regular rhythm.      Heart sounds: Normal heart sounds. No murmur heard.  Pulmonary:      Effort: Pulmonary effort is normal.      Breath sounds: No wheezing or rales.   Abdominal:      General: Abdomen is flat.   Musculoskeletal:      Cervical back: Normal range of motion and neck supple. No rigidity or tenderness.   Lymphadenopathy:      Cervical: No cervical adenopathy.   Skin:     General: Skin is warm and dry.      Findings: No rash.   Neurological:      General: No focal deficit present.      Mental Status: He is alert and oriented to person, place, and time. Mental status is at baseline.   Psychiatric:         Mood and Affect: Mood normal.         Behavior: Behavior normal.         Thought Content: Thought content normal.               Signed Electronically by: Ambrocio Bello DO    Answers submitted by the patient for this visit:  Patient Health Questionnaire (Submitted on 11/25/2024)  If you checked off any problems, how difficult have these problems made it for you to do your work, take care of things at home, or get along with other people?: Somewhat difficult  PHQ9 TOTAL SCORE: 8  Patient Health Questionnaire (G7) (Submitted on 11/25/2024)  GAUTAM 7 TOTAL SCORE: 6

## 2024-11-25 NOTE — PATIENT INSTRUCTIONS
Patient Education       Restart prozac 20mg daily. Follow up in 1 month.  Labs today  Referral to immunology.  Options for nerve pain medication  Gabapentin (1-3x/day)  Lyrica 1-2x/day  Referral to dermatology      Dr. Bello      Thank you for coming to see me today! Here are a couple of pieces of information about my schedule and communication practices.     I am not in the clinic on Tuesdays. Non-urgent calls and messages received on Tuesdays will be addressed as soon as I am able when I am back in the office on the next business day. Urgent calls will be addressed by a covering clinician.       If lab work was done today as part of your evaluation you will generally be contacted via TrackaPhone, mail, or phone with the results within 7-10 days.  If there is an alarming/concerning result we will contact you by phone. Lab results come back at varying times, I generally wait until all labs are resulted before making comments on results. Please note, labs are automatically released to TrackaPhone once available, but it may take a couple of days for my interpretation note to appear.      I try very hard to respond to medical messages with 2 business days of receiving them. Occasionally it takes me longer if I am trying to figure out the best way to respond and need to seek guidance, do some research or dig deeper into your medical history to come up with a helpful response.      If you need refills please contact your pharmacist. They will send a refill request to me to review. Please allow 3-5 business days for us to respond to all refill requests.      Please call or send a medical message with any questions or concerns. Thank you for trusting me to be part of your healthcare team!        Dr. Ambrocio Bello    Preventive Care Advice   This is general advice given by our system to help you stay healthy. However, your care team may have specific advice just for you. Please talk to your care team about your preventive care  needs.  Nutrition  Eat 5 or more servings of fruits and vegetables each day.  Try wheat bread, brown rice and whole grain pasta (instead of white bread, rice, and pasta).  Get enough calcium and vitamin D. Check the label on foods and aim for 100% of the RDA (recommended daily allowance).  Lifestyle  Exercise at least 150 minutes each week  (30 minutes a day, 5 days a week).  Do muscle strengthening activities 2 days a week. These help control your weight and prevent disease.  No smoking.  Wear sunscreen to prevent skin cancer.  Have a dental exam and cleaning every 6 months.  Yearly exams  See your health care team every year to talk about:  Any changes in your health.  Any medicines your care team has prescribed.  Preventive care, family planning, and ways to prevent chronic diseases.  Shots (vaccines)   HPV shots (up to age 26), if you've never had them before.  Hepatitis B shots (up to age 59), if you've never had them before.  COVID-19 shot: Get this shot when it's due.  Flu shot: Get a flu shot every year.  Tetanus shot: Get a tetanus shot every 10 years.  Pneumococcal, hepatitis A, and RSV shots: Ask your care team if you need these based on your risk.  Shingles shot (for age 50 and up)  General health tests  Diabetes screening:  Starting at age 35, Get screened for diabetes at least every 3 years.  If you are younger than age 35, ask your care team if you should be screened for diabetes.  Cholesterol test: At age 39, start having a cholesterol test every 5 years, or more often if advised.  Bone density scan (DEXA): At age 50, ask your care team if you should have this scan for osteoporosis (brittle bones).  Hepatitis C: Get tested at least once in your life.  STIs (sexually transmitted infections)  Before age 24: Ask your care team if you should be screened for STIs.  After age 24: Get screened for STIs if you're at risk. You are at risk for STIs (including HIV) if:  You are sexually active with more than  one person.  You don't use condoms every time.  You or a partner was diagnosed with a sexually transmitted infection.  If you are at risk for HIV, ask about PrEP medicine to prevent HIV.  Get tested for HIV at least once in your life, whether you are at risk for HIV or not.  Cancer screening tests  Cervical cancer screening: If you have a cervix, begin getting regular cervical cancer screening tests starting at age 21.  Breast cancer scan (mammogram): If you've ever had breasts, begin having regular mammograms starting at age 40. This is a scan to check for breast cancer.  Colon cancer screening: It is important to start screening for colon cancer at age 45.  Have a colonoscopy test every 10 years (or more often if you're at risk) Or, ask your provider about stool tests like a FIT test every year or Cologuard test every 3 years.  To learn more about your testing options, visit:   .  For help making a decision, visit:   https://bit.ly/qg84691.  Prostate cancer screening test: If you have a prostate, ask your care team if a prostate cancer screening test (PSA) at age 55 is right for you.  Lung cancer screening: If you are a current or former smoker ages 50 to 80, ask your care team if ongoing lung cancer screenings are right for you.  For informational purposes only. Not to replace the advice of your health care provider. Copyright   2023 [facility]. All rights reserved. Clinically reviewed by the [facility]. Angella Joy [id number] - REV 01/24.  Learning About Stress  What is stress?     Stress is your body's response to a hard situation. Your body can have a physical, emotional, or mental response. Stress is a fact of life for most people, and it affects everyone differently. What causes stress for you may not be stressful for someone else.  A lot of things can cause stress. You may feel stress when you go on a job interview, take a test, or run a race. This kind of short-term  stress is normal and even useful. It can help you if you need to work hard or react quickly. For example, stress can help you finish an important job on time.  Long-term stress is caused by ongoing stressful situations or events. Examples of long-term stress include long-term health problems, ongoing problems at work, or conflicts in your family. Long-term stress can harm your health.  How does stress affect your health?  When you are stressed, your body responds as though you are in danger. It makes hormones that speed up your heart, make you breathe faster, and give you a burst of energy. This is called the fight-or-flight stress response. If the stress is over quickly, your body goes back to normal and no harm is done.  But if stress happens too often or lasts too long, it can have bad effects. Long-term stress can make you more likely to get sick, and it can make symptoms of some diseases worse. If you tense up when you are stressed, you may develop neck, shoulder, or low back pain. Stress is linked to high blood pressure and heart disease.  Stress also harms your emotional health. It can make you marshall, tense, or depressed. Your relationships may suffer, and you may not do well at work or school.  What can you do to manage stress?  You can try these things to help manage stress:   Do something active. Exercise or activity can help reduce stress. Walking is a great way to get started. Even everyday activities such as housecleaning or yard work can help.  Try yoga or fanta chi. These techniques combine exercise and meditation. You may need some training at first to learn them.  Do something you enjoy. For example, listen to music or go to a movie. Practice your hobby or do volunteer work.  Meditate. This can help you relax, because you are not worrying about what happened before or what may happen in the future.  Do guided imagery. Imagine yourself in any setting that helps you feel calm. You can use online videos,  "books, or a teacher to guide you.  Do breathing exercises. For example:  From a standing position, bend forward from the waist with your knees slightly bent. Let your arms dangle close to the floor.  Breathe in slowly and deeply as you return to a standing position. Roll up slowly and lift your head last.  Hold your breath for just a few seconds in the standing position.  Breathe out slowly and bend forward from the waist.  Let your feelings out. Talk, laugh, cry, and express anger when you need to. Talking with supportive friends or family, a counselor, or a eloy leader about your feelings is a healthy way to relieve stress. Avoid discussing your feelings with people who make you feel worse.  Write. It may help to write about things that are bothering you. This helps you find out how much stress you feel and what is causing it. When you know this, you can find better ways to cope.  What can you do to prevent stress?  You might try some of these things to help prevent stress:  Manage your time. This helps you find time to do the things you want and need to do.  Get enough sleep. Your body recovers from the stresses of the day while you are sleeping.  Get support. Your family, friends, and community can make a difference in how you experience stress.  Limit your news feed. Avoid or limit time on social media or news that may make you feel stressed.  Do something active. Exercise or activity can help reduce stress. Walking is a great way to get started.  Where can you learn more?  Go to https://www.[employer].net/patiented  Enter N032 in the search box to learn more about \"Learning About Stress.\"  Current as of: October 24, 2023  Content Version: 14.2 2024 [facility] [employer].   Care instructions adapted under license by your healthcare professional. If you have questions about a medical condition or this instruction, always ask your healthcare professional. [employer] disclaims any warranty or " liability for your use of this information.    Learning About Depression Screening  What is depression screening?  Depression screening is a way to see if you have depression symptoms. It may be done by a doctor or counselor. It's often part of a routine checkup. That's because your mental health is just as important as your physical health.  Depression is a mental health condition that affects how you feel, think, and act. You may:  Have less energy.  Lose interest in your daily activities.  Feel sad and grouchy for a long time.  Depression is very common. It affects people of all ages.  Many things can lead to depression. Some people become depressed after they have a stroke or find out they have a major illness like cancer or heart disease. The death of a loved one or a breakup may lead to depression. It can run in families. Most experts believe that a combination of inherited genes and stressful life events can cause it.  What happens during screening?  You may be asked to fill out a form about your depression symptoms. You and the doctor will discuss your answers. The doctor may ask you more questions to learn more about how you think, act, and feel.  What happens after screening?  If you have symptoms of depression, your doctor will talk to you about your options.  Doctors usually treat depression with medicines or counseling. Often, combining the two works best. Many people don't get help because they think that they'll get over the depression on their own. But people with depression may not get better unless they get treatment.  The cause of depression is not well understood. There may be many factors involved. But if you have depression, it's not your fault.  A serious symptom of depression is thinking about death or suicide. If you or someone you care about talks about this or about feeling hopeless, get help right away.  It's important to know that depression can be treated. Medicine, counseling, and  "self-care may help.  Where can you learn more?  Go to https://www.[employer].net/patiented  Enter T185 in the search box to learn more about \"Learning About Depression Screening.\"  Current as of: June 24, 2023  Content Version: 14.2 2024 [facility] [employer].   Care instructions adapted under license by your healthcare professional. If you have questions about a medical condition or this instruction, always ask your healthcare professional. [employer] disclaims any warranty or liability for your use of this information.    9 Ways to Cut Back on Drinking  Maybe you've found yourself drinking more alcohol than you'd prefer. If you want to cut back, here are some ideas to try.    Think before you drink.  Do you really want a drink, or is it just a habit? If you're used to having a drink at a certain time, try doing something else then.     Look for substitutes.  Find some no-alcohol drinks that you enjoy, like flavored seltzer water, tea with honey, or tonic with a slice of lime. Or try alcohol-free beer or \"virgin\" cocktails (without the alcohol).     Drink more water.  Use water to quench your thirst. Drink a glass of water before you have any alcohol. Have another glass along with every drink or between drinks.     Shrink your drink.  For example, have a bottle of beer instead of a pint. Use a smaller glass for wine. Choose drinks with lower alcohol content (ABV%). Or use less liquor and more mixer in cocktails.     Slow down.  It's easy to drink quickly and without thinking about it. Pay attention, and make each drink last longer.     Do the math.  Total up how much you spend on alcohol each month. How much is that a year? If you cut back, what could you do with the money you save?     Take a break.  Choose a day or two each week when you won't drink at all. Notice how you feel on those days, physically and emotionally. How did you sleep? Do you feel better? Over time, add more break days.     " "Count calories.  Would you like to lose some weight? For some people that's a good motivator for cutting back. Figure out how many calories are in each drink. How many does that add up to in a day? In a week? In a month?     Practice saying no.  Be ready when someone offers you a drink. Try: \"Thanks, I've had enough.\" Or \"Thanks, but I'm cutting back.\" Or \"No, thanks. I feel better when I drink less.\"   Current as of: November 15, 2023  Content Version: 14.2 2024 [facility] [employer].   Care instructions adapted under license by your healthcare professional. If you have questions about a medical condition or this instruction, always ask your healthcare professional. Healthwise[employer] disclaims any warranty or liability for your use of this information.     "

## 2024-11-25 NOTE — NURSING NOTE
Prior to immunization administration, verified patients identity using patient s name and date of birth. Please see Immunization Activity for additional information.     Screening Questionnaire for Adult Immunization    Are you sick today?   No   Do you have allergies to medications, food, a vaccine component or latex?   No   Have you ever had a serious reaction after receiving a vaccination?   No   Do you have a long-term health problem with heart, lung, kidney, or metabolic disease (e.g., diabetes), asthma, a blood disorder, no spleen, complement component deficiency, a cochlear implant, or a spinal fluid leak?  Are you on long-term aspirin therapy?   No   Do you have cancer, leukemia, HIV/AIDS, or any other immune system problem?   No   Do you have a parent, brother, or sister with an immune system problem?   No   In the past 3 months, have you taken medications that affect  your immune system, such as prednisone, other steroids, or anticancer drugs; drugs for the treatment of rheumatoid arthritis, Crohn s disease, or psoriasis; or have you had radiation treatments?   No   Have you had a seizure, or a brain or other nervous system problem?   No   During the past year, have you received a transfusion of blood or blood    products, or been given immune (gamma) globulin or antiviral drug?   No   For women: Are you pregnant or is there a chance you could become       pregnant during the next month?   No   Have you received any vaccinations in the past 4 weeks?   No     Immunization questionnaire answers were all negative.              Patient instructed to remain in clinic for 15 minutes afterwards, and to report any adverse reactions.     Screening performed by Francis Roche MA on 11/25/2024 at 2:59 PM.

## 2024-11-26 LAB
ANA PAT SER IF-IMP: ABNORMAL
ANA SER QL IF: ABNORMAL
ANA TITR SER IF: ABNORMAL {TITER}
CHOLEST SERPL-MCNC: 233 MG/DL
CRP SERPL-MCNC: <3 MG/L
FASTING STATUS PATIENT QL REPORTED: NO
HDLC SERPL-MCNC: 52 MG/DL
LDLC SERPL CALC-MCNC: 143 MG/DL
NONHDLC SERPL-MCNC: 181 MG/DL
PATH REPORT.COMMENTS IMP SPEC: NORMAL
PATH REPORT.FINAL DX SPEC: NORMAL
PATH REPORT.MICROSCOPIC SPEC OTHER STN: NORMAL
PATH REPORT.MICROSCOPIC SPEC OTHER STN: NORMAL
PATH REPORT.RELEVANT HX SPEC: NORMAL
TRIGL SERPL-MCNC: 191 MG/DL
TSH SERPL DL<=0.005 MIU/L-ACNC: 0.66 UIU/ML (ref 0.3–4.2)

## 2024-11-27 ENCOUNTER — DOCUMENTATION ONLY (OUTPATIENT)
Dept: FAMILY MEDICINE | Facility: CLINIC | Age: 39
End: 2024-11-27
Payer: COMMERCIAL

## 2024-11-27 NOTE — PROGRESS NOTES
Please let patient know Lucila apparently does not have immunology but he could see [place] [facility] instead. Can you ask if this is who he saw in the past? If so, then maybe we should refer him to infectious disease instead. Let me know.    Dr. Bello

## 2024-12-02 DIAGNOSIS — R89.9 ABNORMAL LABORATORY TEST: Primary | ICD-10-CM

## 2024-12-02 NOTE — PROGRESS NOTES
Left message on answering machine for patient to call clinic triage.   Please call us to review this provider question.  Thanks!  SUKUMAR Figueroa

## 2024-12-03 ENCOUNTER — E-VISIT (OUTPATIENT)
Dept: URGENT CARE | Facility: CLINIC | Age: 39
End: 2024-12-03
Payer: COMMERCIAL

## 2024-12-03 DIAGNOSIS — J01.90 ACUTE BACTERIAL SINUSITIS: Primary | ICD-10-CM

## 2024-12-03 DIAGNOSIS — B96.89 ACUTE BACTERIAL SINUSITIS: Primary | ICD-10-CM

## 2024-12-03 RX ORDER — PREDNISONE 50 MG/1
50 TABLET ORAL DAILY
Qty: 5 TABLET | Refills: 0 | Status: SHIPPED | OUTPATIENT
Start: 2024-12-03 | End: 2024-12-08

## 2024-12-03 NOTE — PATIENT INSTRUCTIONS
Acute Sinusitis: Care Instructions  Overview     Acute sinusitis is an inflammation of the mucous membranes inside the nose and sinuses. Sinuses are the hollow spaces in your skull around the eyes and nose. Acute sinusitis often follows a cold. Acute sinusitis causes thick, discolored mucus that drains from the nose or down the back of the throat. It also can cause pain and pressure in your head and face along with a stuffy or blocked nose.  In most cases, sinusitis gets better on its own in 1 to 2 weeks. But some mild symptoms may last for several weeks. Sometimes antibiotics are needed if there is a bacterial infection.  Follow-up care is a key part of your treatment and safety. Be sure to make and go to all appointments, and call your doctor if you are having problems. It's also a good idea to know your test results and keep a list of the medicines you take.  How can you care for yourself at home?  Use saline (saltwater) nasal washes. This can help keep your nasal passages open and wash out mucus and allergens.  You can buy saline nose washes at a grocery store or drugstore. Follow the instructions on the package.  You can make your own at home. Add 1 teaspoon of non-iodized salt and 1 teaspoon of baking soda to 2 cups of distilled or boiled and cooled water. Fill a squeeze bottle or a nasal cleansing pot (such as a neti pot) with the nasal wash. Then put the tip into your nostril, and lean over the sink. With your mouth open, gently squirt the liquid. Repeat on the other side.  Try a decongestant nasal spray like oxymetazoline (Afrin). Do not use it for more than 3 days in a row. Using it for more than 3 days can make your congestion worse.  If needed, take an over-the-counter pain medicine, such as acetaminophen (Tylenol), ibuprofen (Advil, Motrin), or naproxen (Aleve). Read and follow all instructions on the label.  If the doctor prescribed antibiotics, take them as directed. Do not stop taking them just  "because you feel better. You need to take the full course of antibiotics.  Be careful when taking over-the-counter cold or flu medicines and Tylenol at the same time. Many of these medicines have acetaminophen, which is Tylenol. Read the labels to make sure that you are not taking more than the recommended dose. Too much acetaminophen (Tylenol) can be harmful.  Try a steroid nasal spray. It may help with your symptoms.  Breathe warm, moist air. You can use a steamy shower, a hot bath, or a sink filled with hot water. Avoid cold, dry air. Using a humidifier in your home may help. Follow the directions for cleaning the machine.  When should you call for help?   Call your doctor now or seek immediate medical care if:    You have new or worse swelling, redness, or pain in your face or around one or both of your eyes.     You have double vision or a change in your vision.     You have a high fever.     You have a severe headache and a stiff neck.     You have mental changes, such as feeling confused or much less alert.   Watch closely for changes in your health, and be sure to contact your doctor if:    You are not getting better as expected.   Where can you learn more?  Go to https://www.[employer].net/patiented  Enter I933 in the search box to learn more about \"Acute Sinusitis: Care Instructions.\"  Current as of: September 27, 2023  Content Version: 14.2 2024 [facility] [employer].   Care instructions adapted under license by your healthcare professional. If you have questions about a medical condition or this instruction, always ask your healthcare professional. [employer] disclaims any warranty or liability for your use of this information.    Dear Rico Estrada      Based on your responses and diagnosis, I have prescribed Augmentin to treat your symptoms. I have sent this to your pharmacy.?     It is also important to stay well hydrated, get lots of rest and take over-the-counter " decongestants,?tylenol?or ibuprofen if you?are able to?take those medications per your primary care provider to help relieve discomfort.?     It is important that you take?all of?your prescribed medication even if your symptoms are improving after a few doses.? Taking?all of?your medicine helps prevent the symptoms from returning.?     If your symptoms worsen, you develop severe headache, vomiting, high fever (>102), or are not improving in 7 days, please contact your primary care provider for an appointment or visit any of our convenient Walk-in Care or Urgent Care Centers to be seen which can be found on our website?here.?     Thanks again for choosing?us?as your health care partner,?   ?  Dung Mario MD?   Thank you for choosing us for your care. I have placed an order for a prescription so that you can start treatment. View your full visit summary for details by clicking on the link below. Your pharmacist will able to address any questions you may have about the medication.     If you're not feeling better within 5-7 days, please schedule an appointment.  You can schedule an appointment right here in [facility] [place], or call [phone]  If the visit is for the same symptoms as your eVisit, we'll refund the cost of your eVisit if seen within seven days.

## 2025-01-15 ENCOUNTER — VIRTUAL VISIT (OUTPATIENT)
Dept: FAMILY MEDICINE | Facility: CLINIC | Age: 40
End: 2025-01-15
Payer: COMMERCIAL

## 2025-01-15 DIAGNOSIS — M79.7 FIBROMYALGIA: ICD-10-CM

## 2025-01-15 DIAGNOSIS — F41.9 ANXIETY AND DEPRESSION: Primary | ICD-10-CM

## 2025-01-15 DIAGNOSIS — F32.A ANXIETY AND DEPRESSION: Primary | ICD-10-CM

## 2025-01-15 DIAGNOSIS — F33.1 MODERATE RECURRENT MAJOR DEPRESSION (H): ICD-10-CM

## 2025-01-15 DIAGNOSIS — R68.89 FREQUENTLY SICK: ICD-10-CM

## 2025-01-15 PROCEDURE — 98006 SYNCH AUDIO-VIDEO EST MOD 30: CPT | Performed by: STUDENT IN AN ORGANIZED HEALTH CARE EDUCATION/TRAINING PROGRAM

## 2025-01-15 RX ORDER — FLUOXETINE 10 MG/1
10 CAPSULE ORAL DAILY
Qty: 7 CAPSULE | Refills: 0 | Status: SHIPPED | OUTPATIENT
Start: 2025-01-15 | End: 2025-01-22

## 2025-01-15 RX ORDER — DULOXETIN HYDROCHLORIDE 30 MG/1
CAPSULE, DELAYED RELEASE ORAL
Qty: 90 CAPSULE | Refills: 0 | Status: SHIPPED | OUTPATIENT
Start: 2025-01-15

## 2025-01-15 ASSESSMENT — ANXIETY QUESTIONNAIRES
IF YOU CHECKED OFF ANY PROBLEMS ON THIS QUESTIONNAIRE, HOW DIFFICULT HAVE THESE PROBLEMS MADE IT FOR YOU TO DO YOUR WORK, TAKE CARE OF THINGS AT HOME, OR GET ALONG WITH OTHER PEOPLE: SOMEWHAT DIFFICULT
GAD7 TOTAL SCORE: 14
7. FEELING AFRAID AS IF SOMETHING AWFUL MIGHT HAPPEN: MORE THAN HALF THE DAYS
4. TROUBLE RELAXING: MORE THAN HALF THE DAYS
3. WORRYING TOO MUCH ABOUT DIFFERENT THINGS: MORE THAN HALF THE DAYS
6. BECOMING EASILY ANNOYED OR IRRITABLE: MORE THAN HALF THE DAYS
1. FEELING NERVOUS, ANXIOUS, OR ON EDGE: MORE THAN HALF THE DAYS
2. NOT BEING ABLE TO STOP OR CONTROL WORRYING: MORE THAN HALF THE DAYS
8. IF YOU CHECKED OFF ANY PROBLEMS, HOW DIFFICULT HAVE THESE MADE IT FOR YOU TO DO YOUR WORK, TAKE CARE OF THINGS AT HOME, OR GET ALONG WITH OTHER PEOPLE?: SOMEWHAT DIFFICULT
5. BEING SO RESTLESS THAT IT IS HARD TO SIT STILL: MORE THAN HALF THE DAYS
GAD7 TOTAL SCORE: 14

## 2025-01-15 NOTE — LETTER
To whom it may concern:        Please excuse Rico Estrada from work on 1/15/25 due to illness.          Dr. Bello

## 2025-01-15 NOTE — PATIENT INSTRUCTIONS
Prozac-->take 10mg capsule daily for 1 week then stop  After you stop prozac, start cymbalta  Cymbalta-->start 30mg daily for 2 weeks, then increase to 30mg twice/daily  Work to find a therapist you like  Send me a mycSwitchNotet message regarding how much gabapentin you are taking    Follow up next month      Dr. Bello

## 2025-01-15 NOTE — PROGRESS NOTES
Rico is a 39 year old who is being evaluated via a billable video visit.    How would you like to obtain your AVS? MyChart  If the video visit is dropped, the invitation should be resent by: Text to cell phone: [phone]  Will anyone else be joining your video visit? No      Assessment & Plan     Moderate recurrent major depression (H)  Anxiety and depression  Fibromyalgia  Anxiety not well controlled on buspar and prozac. Due to concerns for possible fibromyalgia (chronic body aches, anxiety/depression), will switch to cymbalta.     Plan:  Prozac-->take 10mg capsule daily for 1 week then stop  After you stop prozac, start cymbalta  Cymbalta-->start 30mg daily for 2 weeks, then increase to 30mg twice/daily  Work to find a therapist you like  He will mychart me the amount/freq of gabapentin he is taking  Follow up in 1 month    Frequently sick  Frequent illnesses-mainly URI, but currently recovering from food poisoning (suspect norovirus). Prior lab workup has been unrevealing. He is seeing [facility] for a second opinion soon.          The longitudinal plan of care for the diagnosis(es)/condition(s) as documented were addressed during this visit. Due to the added complexity in care, I will continue to support Rico in the subsequent management and with ongoing continuity of care.        Subjective   Rico is a 39 year old, presenting for the following health issues:  Recheck Medication (Anixety/depression follow up. Pt states depression is still an issue, wondering about increasing dosage.) and Health Maintenance (Previous referral to [facility], pt wanting to discuss possibility on what's going on with this. )        1/15/2025     9:18 AM   Additional Questions   Roomed by Opal LOOMIS LPN     History of Present Illness       He eats 2-3 servings of fruits and vegetables daily.He consumes 0 sweetened beverage(s) daily.He exercises with enough effort to increase his heart rate 30 to 60 minutes per day.  He exercises  with enough effort to increase his heart rate 4 days per week.      Anxiety/depression follow up  Restarted prozac 20mg in November  Continued on buspar 7.5mg TID  Anxiety scores worsened  States depression is not well controlled  Affecting work, still going though  Lots of anxiety due to getting sick    [facility] referral-->accepted him  Pt restarted gabapentin that he had left over  Thinks he has fibromyalgia  Lots of body aches, fatigue          11/25/2024     1:55 PM 1/15/2025     9:21 AM   GAUTAM-7 SCORE   Total Score 6 (mild anxiety) 14 (moderate anxiety)   Total Score 6  14        Patient-reported           1/2/2023     1:16 PM 4/24/2023     4:35 PM 11/25/2024     1:55 PM   PHQ   PHQ-9 Total Score 8 7 8    Q9: Thoughts of better off dead/self-harm past 2 weeks Not at all  Not at all Not at all       Patient-reported    Proxy-reported           Objective         Vitals:  No vitals were obtained today due to virtual visit.    Physical Exam   GENERAL: alert and no distress  EYES: Eyes grossly normal to inspection.  No discharge or erythema, or obvious scleral/conjunctival abnormalities.  RESP: No audible wheeze, cough, or visible cyanosis.    SKIN: Visible skin clear. No significant rash, abnormal pigmentation or lesions.  NEURO: Cranial nerves grossly intact.  Mentation and speech appropriate for age.  PSYCH: Appropriate affect, tone, and pace of words      Video-Visit Details    Type of service:  Video Visit   Originating Location (pt. Location): Home  Distant Location (provider location):  Off-site  Platform used for Video Visit: Delilah  Signed Electronically by: Ambrocio Bello DO

## 2025-01-23 DIAGNOSIS — F32.A ANXIETY AND DEPRESSION: ICD-10-CM

## 2025-01-23 DIAGNOSIS — F41.9 ANXIETY AND DEPRESSION: ICD-10-CM

## 2025-01-23 DIAGNOSIS — M79.7 FIBROMYALGIA: ICD-10-CM

## 2025-01-23 DIAGNOSIS — F33.1 MODERATE RECURRENT MAJOR DEPRESSION (H): ICD-10-CM

## 2025-01-23 RX ORDER — FLUOXETINE 10 MG/1
CAPSULE ORAL
Qty: 7 CAPSULE | Refills: 0 | OUTPATIENT
Start: 2025-01-23

## 2025-01-23 NOTE — TELEPHONE ENCOUNTER
Dr Bello,     1/15/25 OV noted take Fluoxetine 10mg 1 tab daily x 7 days then stop. Restart Prozac 20mg.     Please send RX for 20mg.     Glenna STEVE RN  [facility]

## 2025-03-13 ENCOUNTER — VIRTUAL VISIT (OUTPATIENT)
Dept: FAMILY MEDICINE | Facility: CLINIC | Age: 40
End: 2025-03-13
Payer: COMMERCIAL

## 2025-03-13 DIAGNOSIS — F33.1 MODERATE RECURRENT MAJOR DEPRESSION (H): Primary | ICD-10-CM

## 2025-03-13 DIAGNOSIS — M79.7 FIBROMYALGIA: ICD-10-CM

## 2025-03-13 DIAGNOSIS — F41.9 ANXIETY AND DEPRESSION: ICD-10-CM

## 2025-03-13 DIAGNOSIS — F32.A ANXIETY AND DEPRESSION: ICD-10-CM

## 2025-03-13 RX ORDER — DULOXETINE 40 MG/1
40 CAPSULE, DELAYED RELEASE ORAL 2 TIMES DAILY
Qty: 60 CAPSULE | Refills: 2 | Status: SHIPPED | OUTPATIENT
Start: 2025-03-13

## 2025-03-13 ASSESSMENT — ANXIETY QUESTIONNAIRES
7. FEELING AFRAID AS IF SOMETHING AWFUL MIGHT HAPPEN: SEVERAL DAYS
8. IF YOU CHECKED OFF ANY PROBLEMS, HOW DIFFICULT HAVE THESE MADE IT FOR YOU TO DO YOUR WORK, TAKE CARE OF THINGS AT HOME, OR GET ALONG WITH OTHER PEOPLE?: SOMEWHAT DIFFICULT
1. FEELING NERVOUS, ANXIOUS, OR ON EDGE: SEVERAL DAYS
4. TROUBLE RELAXING: MORE THAN HALF THE DAYS
3. WORRYING TOO MUCH ABOUT DIFFERENT THINGS: SEVERAL DAYS
GAD7 TOTAL SCORE: 9
6. BECOMING EASILY ANNOYED OR IRRITABLE: SEVERAL DAYS
GAD7 TOTAL SCORE: 9
2. NOT BEING ABLE TO STOP OR CONTROL WORRYING: SEVERAL DAYS
7. FEELING AFRAID AS IF SOMETHING AWFUL MIGHT HAPPEN: SEVERAL DAYS
IF YOU CHECKED OFF ANY PROBLEMS ON THIS QUESTIONNAIRE, HOW DIFFICULT HAVE THESE PROBLEMS MADE IT FOR YOU TO DO YOUR WORK, TAKE CARE OF THINGS AT HOME, OR GET ALONG WITH OTHER PEOPLE: SOMEWHAT DIFFICULT
5. BEING SO RESTLESS THAT IT IS HARD TO SIT STILL: MORE THAN HALF THE DAYS
GAD7 TOTAL SCORE: 9

## 2025-03-13 ASSESSMENT — PATIENT HEALTH QUESTIONNAIRE - PHQ9
10. IF YOU CHECKED OFF ANY PROBLEMS, HOW DIFFICULT HAVE THESE PROBLEMS MADE IT FOR YOU TO DO YOUR WORK, TAKE CARE OF THINGS AT HOME, OR GET ALONG WITH OTHER PEOPLE: SOMEWHAT DIFFICULT
SUM OF ALL RESPONSES TO PHQ QUESTIONS 1-9: 11
SUM OF ALL RESPONSES TO PHQ QUESTIONS 1-9: 11

## 2025-03-13 NOTE — PROGRESS NOTES
Rico is a 39 year old who is being evaluated via a billable video visit.    How would you like to obtain your AVS? MyChart  If the video visit is dropped, the invitation should be resent by: Text to cell phone: [phone]  Will anyone else be joining your video visit? No      Assessment & Plan     Moderate recurrent major depression (H)  Anxiety and depression  Fibromyalgia  Doing well since stopping prozac and starting duloxetine. Still has anxiety but it is improving. Has been to [place] and seen many specialists who agreed with duloxetine. [place] suggested increasing dose to 45mg BID however there is no 45mg capsule. We elected to increase the dose to 40mg BID. He will mychart me in 2 weeks, and if noticing improvement will increase dose further to 60mg BID.   - DULoxetine 40 MG CPEP  Dispense: 60 capsule; Refill: 2      The longitudinal plan of care for the diagnosis(es)/condition(s) as documented were addressed during this visit. Due to the added complexity in care, I will continue to support Rico in the subsequent management and with ongoing continuity of care.      Subjective   Rico is a 39 year old, presenting for the following health issues:  Follow Up (Depression, anxiety, fibromyalgia )        3/13/2025     4:35 PM   Additional Questions   Roomed by Tani OLIVAS     History of Present Illness       Mental Health Follow-up:  Patient presents to follow-up on Depression & Anxiety.Patient's depression since last visit has been:  Better  The patient is having other symptoms associated with depression.  Patient's anxiety since last visit has been:  Better  The patient is having other symptoms associated with anxiety.  Any significant life events: job concerns, financial concerns, housing concerns and health concerns  Patient is feeling anxious or having panic attacks.  Patient has concerns about alcohol or drug use.    He eats 0-1 servings of fruits and vegetables daily.He consumes 0 sweetened beverage(s)  daily.He exercises with enough effort to increase his heart rate 30 to 60 minutes per day.  He exercises with enough effort to increase his heart rate 3 or less days per week.   He is taking medications regularly.        Got strep, otherwise feeling healthy!  Went to [place] last week-saw psychiatry who recommended discussing increasing duloxetine 45mg BID  Feels that meds are helping        11/25/2024     1:55 PM 1/15/2025     9:21 AM 3/13/2025     4:27 PM   GAUTAM-7 SCORE   Total Score 6 (mild anxiety) 14 (moderate anxiety) 9 (mild anxiety)   Total Score 6  14  9        Patient-reported           4/24/2023     4:35 PM 11/25/2024     1:55 PM 3/13/2025     4:26 PM   PHQ   PHQ-9 Total Score 7 8  11    Q9: Thoughts of better off dead/self-harm past 2 weeks Not at all Not at all Not at all       Patient-reported           Objective    Vitals - Patient Reported  Pain Score: No Pain (0)      Vitals:  No vitals were obtained today due to virtual visit.    Physical Exam   GENERAL: alert and no distress  EYES: Eyes grossly normal to inspection.  No discharge or erythema, or obvious scleral/conjunctival abnormalities.  RESP: No audible wheeze, cough, or visible cyanosis.    SKIN: Visible skin clear. No significant rash, abnormal pigmentation or lesions.  NEURO: Cranial nerves grossly intact.  Mentation and speech appropriate for age.  PSYCH: Appropriate affect, tone, and pace of words        Video-Visit Details    Type of service:  Video Visit   Originating Location (pt. Location): Home  Distant Location (provider location):  On-site  Platform used for Video Visit: Delilah  Signed Electronically by: Ambrocio Bello DO

## 2025-03-13 NOTE — PATIENT INSTRUCTIONS
Increase cymbalta to 40mg twice/day.  Let me know in 2 weeks if you want to increase the dose further.      Dr. Bello

## 2025-03-27 NOTE — TELEPHONE ENCOUNTER
REFERRAL INFORMATION:  Referring Provider:    Referring Clinic:    Reason for Visit/Diagnosis: Gallbladder, per patient. Records at [facility], per patient.        FUTURE VISIT INFORMATION:  Appointment Date: 4/3/25  Appointment Time:      NOTES RECORD STATUS  DETAILS   HOSPITAL DISCHARGE SUMMARY/ ED VISITS  Care Everywhere HP:  ED 3/24/25-[facility]   PERTINENT LABS Care Everywhere    IMAGING (CT, MRI, US, XR)  In process HP:  3/24/25-US abdomen  10/28/20-XR abdomen     Records Requested    Facility  On license of [facility]  Fax:    Outcome Requested images

## 2025-04-03 ENCOUNTER — PRE VISIT (OUTPATIENT)
Dept: SURGERY | Facility: CLINIC | Age: 40
End: 2025-04-03

## 2025-04-03 ENCOUNTER — LAB (OUTPATIENT)
Dept: LAB | Facility: CLINIC | Age: 40
End: 2025-04-03
Payer: COMMERCIAL

## 2025-04-03 ENCOUNTER — OFFICE VISIT (OUTPATIENT)
Dept: SURGERY | Facility: CLINIC | Age: 40
End: 2025-04-03
Payer: COMMERCIAL

## 2025-04-03 VITALS
BODY MASS INDEX: 33.47 KG/M2 | DIASTOLIC BLOOD PRESSURE: 85 MMHG | HEIGHT: 71 IN | HEART RATE: 68 BPM | SYSTOLIC BLOOD PRESSURE: 125 MMHG | OXYGEN SATURATION: 98 % | WEIGHT: 239.1 LBS

## 2025-04-03 DIAGNOSIS — R10.13 ABDOMINAL PAIN, EPIGASTRIC: ICD-10-CM

## 2025-04-03 DIAGNOSIS — R10.13 ABDOMINAL PAIN, EPIGASTRIC: Primary | ICD-10-CM

## 2025-04-03 RX ORDER — GABAPENTIN 600 MG/1
600 TABLET ORAL 2 TIMES DAILY
COMMUNITY

## 2025-04-03 ASSESSMENT — PAIN SCALES - GENERAL: PAINLEVEL_OUTOF10: MODERATE PAIN (6)

## 2025-04-03 NOTE — LETTER
4/3/2025       RE: Rico Estrada  [address]  [place] [address]     Dear Colleague,    Thank you for referring your patient, Rico Estrada, to the [facility] [place] [facility]. Please see a copy of my visit note below.    I met with Rico Estrada and his wife for follow-up of a recent ED visit to [facility]. He presented there with ongoing nausea and vomiting of unclear etiology.  He was given some fluid, had some labwork drawn and a RUQUS was performed that was read as normal.  The ED team felt some sludge was evident on the US and so referred him here.    He describes an intermittent upper abdominal pain- bilateral subcostal area.  No clear inciting factors.  This will lead to vomiting.  Can happen in the middle of the night (after being asleep for several hours) or during the day.  Most recently happened immediately after eating some toast and drinking some water.  Is not always triggered by food.  He and his wife note his abdomen appears more bloated than typical.    He typically has loose stools a few times a day, but recently has been more formed.  Reports intermittent blood in his stool (staining the water of the toilet) that he attributes to possible hemorrhoids.   No obvious GERD symptoms.  No obvious food triggers.  No recent travel.  Has had a somewhat recent new medication- duloxetine, but he tried holding this and did not notice a change in his symptoms.  He rarely drinks etoh.  No history of pancreatitis.  Does use marijuana at night (not a new thing) and he has a friend who had hyperemesis related to marijuana.    Has been taking scheduled reglan and PRN zofran without much help.  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    PE:  /85 (BP Location: Left arm, Patient Position: Sitting, Cuff Size: Adult  "Regular)   Pulse 68   Ht 1.803 m (5' 11\")   Wt 108.5 kg (239 lb 1.6 oz)   SpO2 98%   BMI 33.35 kg/m    Alert, pleasant  RRR  No resp distress or wheezing  Abd is soft, mildly distended. Negative zamudio's sign.  No pain to deep palpation. No scars or hernias evident  No jaundice    I reviewed the images and read of his RUQUS.  Possible sludge evident, no stone or other abdnormality.    Labwork notable for normal lipase and LFTs    A/P:  Abdominal pain and nausea/vomiting of unclear etiology.  I believe this sludge seen on the US is more a symptom of his dehydration/illness than a causative.  I do not think his symptoms are biliary in nature, but I do not have a good sense of what else it could be.  Possible medication related.  We discussed typical biliary symptoms (his wife had acute cholecystitis recently and had her gallbladder removed so is pretty aware of symptoms)    I will check an H pylori antigen now.    I referred him to GI for further evaluation    Total time spent- 30 minutes, over 50% spent on counseling/discussion    Again, thank you for allowing me to participate in the care of your patient.      Sincerely,    Ming Bragg MD    "

## 2025-04-03 NOTE — LETTER
4/3/2025    Rico GORAN Estrada   1985        To Whom it May Concern:    Please excuse Rico Estrada from work/school for a healthcare visit on Apr 3, 2025.    Sincerely,        Myrna Kulkarni RN, BSN, CNOR, RNFA on behalf of Ming Bragg MD

## 2025-04-03 NOTE — PROGRESS NOTES
"I met with Rico Estrada and his wife for follow-up of a recent ED visit to [facility]. He presented there with ongoing nausea and vomiting of unclear etiology.  He was given some fluid, had some labwork drawn and a RUQUS was performed that was read as normal.  The ED team felt some sludge was evident on the US and so referred him here.    He describes an intermittent upper abdominal pain- bilateral subcostal area.  No clear inciting factors.  This will lead to vomiting.  Can happen in the middle of the night (after being asleep for several hours) or during the day.  Most recently happened immediately after eating some toast and drinking some water.  Is not always triggered by food.  He and his wife note his abdomen appears more bloated than typical.    He typically has loose stools a few times a day, but recently has been more formed.  Reports intermittent blood in his stool (staining the water of the toilet) that he attributes to possible hemorrhoids.   No obvious GERD symptoms.  No obvious food triggers.  No recent travel.  Has had a somewhat recent new medication- duloxetine, but he tried holding this and did not notice a change in his symptoms.  He rarely drinks etoh.  No history of pancreatitis.  Does use marijuana at night (not a new thing) and he has a friend who had hyperemesis related to marijuana.    Has been taking scheduled reglan and PRN zofran without much help.  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    PE:  /85 (BP Location: Left arm, Patient Position: Sitting, Cuff Size: Adult Regular)   Pulse 68   Ht 1.803 m (5' 11\")   Wt 108.5 kg (239 lb 1.6 oz)   SpO2 98%   BMI 33.35 kg/m    Alert, pleasant  RRR  No resp distress or wheezing  Abd is soft, mildly distended. Negative zamudio's sign.  No pain to deep palpation. No scars or hernias evident  No jaundice    I reviewed the images and read of his " RUQUS.  Possible sludge evident, no stone or other abdnormality.    Labwork notable for normal lipase and LFTs    A/P:  Abdominal pain and nausea/vomiting of unclear etiology.  I believe this sludge seen on the US is more a symptom of his dehydration/illness than a causative.  I do not think his symptoms are biliary in nature, but I do not have a good sense of what else it could be.  Possible medication related.  We discussed typical biliary symptoms (his wife had acute cholecystitis recently and had her gallbladder removed so is pretty aware of symptoms)    I will check an H pylori antigen now.    I referred him to GI for further evaluation    Total time spent- 30 minutes, over 50% spent on counseling/discussion

## 2025-04-03 NOTE — PATIENT INSTRUCTIONS
You met with Dr. Ming Bragg.      Today's visit instructions:    Dr. Bragg would like you to leave a specimen in the lab (or go get the container to bring in) and our office will call you with the results and recommendations.    A referral has been placed for you to consult with Gastroenterology. They will call you directly to arrange this visit.     If you have questions please contact Myrna RN or Pallavi RN during regular clinic hours, Monday through Friday 7:30 AM - 4:00 PM, or you can contact us via [employer] at anytime.       If you have urgent needs after-hours, weekends, or holidays please call the hospital at [phone] and ask to speak with our on-call General Surgery Team.    Appointment [phone]  Nurse Advice (Myrna or Pallavi): [phone]   Surgery Scheduler (Brittney): [phone]  Billing Customer Service:  [phone]  Fax: [phone]

## 2025-04-03 NOTE — NURSING NOTE
"Chief Complaint   Patient presents with    New Patient     Gallbladder consult       Vitals:    04/03/25 0750   BP: 125/85   BP Location: Left arm   Patient Position: Sitting   Cuff Size: Adult Regular   Pulse: 68   SpO2: 98%   Weight: 108.5 kg (239 lb 1.6 oz)   Height: 1.803 m (5' 11\")       Body mass index is 33.35 kg/m .                          Rudolph Meraz, EMT    "

## 2025-04-22 PROBLEM — J32.9 SINUSITIS, CHRONIC: Status: ACTIVE | Noted: 2025-03-06

## 2025-04-22 PROBLEM — G25.81 RESTLESS LEG SYNDROME: Status: ACTIVE | Noted: 2025-03-06

## 2025-04-22 PROBLEM — F41.1 GENERALIZED ANXIETY DISORDER: Status: ACTIVE | Noted: 2023-02-06

## 2025-04-22 PROBLEM — L81.9 PIGMENTED SKIN LESION: Status: ACTIVE | Noted: 2025-03-06

## 2025-04-22 PROBLEM — J30.2 SEASONAL ALLERGIES: Status: ACTIVE | Noted: 2025-03-06

## 2025-04-22 PROBLEM — L29.9 PRURITUS: Status: ACTIVE | Noted: 2025-03-06

## 2025-04-22 PROBLEM — R20.9 SENSORY PROBLEM OF EXTREMITY: Status: ACTIVE | Noted: 2025-03-06

## 2025-04-22 PROBLEM — F12.90 MISUSE OF CANNABIS: Status: ACTIVE | Noted: 2025-03-06

## 2025-04-22 PROBLEM — B99.9 INFECTIOUS DISEASE: Status: ACTIVE | Noted: 2025-03-06

## 2025-04-22 PROBLEM — G93.32 CHRONIC FATIGUE SYNDROME: Status: ACTIVE | Noted: 2025-03-06

## 2025-04-22 PROBLEM — Z88.9 DRUG ALLERGY: Status: ACTIVE | Noted: 2025-03-06

## 2025-04-22 PROBLEM — R39.13 SPLITTING OF URINARY STREAM: Status: ACTIVE | Noted: 2025-03-06

## 2025-04-22 PROBLEM — F32.4 MAJOR DEPRESSIVE DISORDER WITH SINGLE EPISODE, IN PARTIAL REMISSION: Status: ACTIVE | Noted: 2025-03-12

## 2025-04-29 ENCOUNTER — MYC MEDICAL ADVICE (OUTPATIENT)
Dept: FAMILY MEDICINE | Facility: CLINIC | Age: 40
End: 2025-04-29
Payer: COMMERCIAL

## 2025-04-29 NOTE — LETTER
May 1, 2025      Rico Estrada  [address] [place] [address]        To Whom It May Concern:    Please excuse the above patient from work from 4/21/25 through 5/2/25 due to illness.        Sincerely,        Ambrocio Bello, DO      Electronically signed

## 2025-04-29 NOTE — TELEPHONE ENCOUNTER
Dr. Bello - dylon Stafford, appreciate advisement on next best steps/work accommodations.    TRACIE Jones, BSN, PHN, AMB-BC (she/her)  [facility] RN

## 2025-05-01 NOTE — TELEPHONE ENCOUNTER
I can give him a letter for the past week off of work due to fibromyalgia flare up. If you can pend this, and route back to me that would be great!    He should ask his work if they have FMLA forms. If he feels he needs work restrictions, or needs to switch to part time/short term disability temporarily he should talk to HR, get the necessary forms, then schedule a video call with me to set this up.    Of note, he did no-show our appt on 4/23.    Dr. Bello

## 2025-05-13 NOTE — TELEPHONE ENCOUNTER
Do you want me to call this patient and see if he arranged an appointment with urology?   I do not see an order in his chart for this.    Goal Outcome Evaluation:    Plan of Care Reviewed With: patient     Patient was intermittently visible in the milieu. Patient was social and  interactive with peers and staff member. She denied pain. Denied SI/HI/AVH and contracted for safety. Patient was cooperative and complaint with medication. Her hygiene was appropriate. Her intake was adequate. BG this shift was 83. There was no PRN requested this shift. Patient attended group OT. There was no behavioral escalations or safety concerns reported this shift. Will continue the current plan of care and notify the provider of any acute concerns.

## 2025-05-27 ASSESSMENT — ANXIETY QUESTIONNAIRES
IF YOU CHECKED OFF ANY PROBLEMS ON THIS QUESTIONNAIRE, HOW DIFFICULT HAVE THESE PROBLEMS MADE IT FOR YOU TO DO YOUR WORK, TAKE CARE OF THINGS AT HOME, OR GET ALONG WITH OTHER PEOPLE: VERY DIFFICULT
2. NOT BEING ABLE TO STOP OR CONTROL WORRYING: SEVERAL DAYS
GAD7 TOTAL SCORE: 7
3. WORRYING TOO MUCH ABOUT DIFFERENT THINGS: SEVERAL DAYS
7. FEELING AFRAID AS IF SOMETHING AWFUL MIGHT HAPPEN: SEVERAL DAYS
8. IF YOU CHECKED OFF ANY PROBLEMS, HOW DIFFICULT HAVE THESE MADE IT FOR YOU TO DO YOUR WORK, TAKE CARE OF THINGS AT HOME, OR GET ALONG WITH OTHER PEOPLE?: VERY DIFFICULT
GAD7 TOTAL SCORE: 7
GAD7 TOTAL SCORE: 7
4. TROUBLE RELAXING: SEVERAL DAYS
5. BEING SO RESTLESS THAT IT IS HARD TO SIT STILL: SEVERAL DAYS
6. BECOMING EASILY ANNOYED OR IRRITABLE: SEVERAL DAYS
1. FEELING NERVOUS, ANXIOUS, OR ON EDGE: SEVERAL DAYS
7. FEELING AFRAID AS IF SOMETHING AWFUL MIGHT HAPPEN: SEVERAL DAYS

## 2025-05-27 ASSESSMENT — PATIENT HEALTH QUESTIONNAIRE - PHQ9
10. IF YOU CHECKED OFF ANY PROBLEMS, HOW DIFFICULT HAVE THESE PROBLEMS MADE IT FOR YOU TO DO YOUR WORK, TAKE CARE OF THINGS AT HOME, OR GET ALONG WITH OTHER PEOPLE: VERY DIFFICULT
SUM OF ALL RESPONSES TO PHQ QUESTIONS 1-9: 8
SUM OF ALL RESPONSES TO PHQ QUESTIONS 1-9: 8

## 2025-05-28 ENCOUNTER — VIRTUAL VISIT (OUTPATIENT)
Dept: FAMILY MEDICINE | Facility: CLINIC | Age: 40
End: 2025-05-28
Payer: COMMERCIAL

## 2025-05-28 DIAGNOSIS — K21.9 GASTROESOPHAGEAL REFLUX DISEASE WITHOUT ESOPHAGITIS: ICD-10-CM

## 2025-05-28 DIAGNOSIS — Z02.89 ENCOUNTER FOR COMPLETION OF FORM WITH PATIENT: Primary | ICD-10-CM

## 2025-05-28 DIAGNOSIS — G93.32 CHRONIC FATIGUE SYNDROME: ICD-10-CM

## 2025-05-28 DIAGNOSIS — M79.7 FIBROMYALGIA: ICD-10-CM

## 2025-05-28 PROBLEM — K21.00 GASTROESOPHAGEAL REFLUX DISEASE WITH ESOPHAGITIS WITHOUT HEMORRHAGE: Status: ACTIVE | Noted: 2025-05-28

## 2025-05-28 PROCEDURE — 98006 SYNCH AUDIO-VIDEO EST MOD 30: CPT | Performed by: STUDENT IN AN ORGANIZED HEALTH CARE EDUCATION/TRAINING PROGRAM

## 2025-05-28 PROCEDURE — 1126F AMNT PAIN NOTED NONE PRSNT: CPT | Performed by: STUDENT IN AN ORGANIZED HEALTH CARE EDUCATION/TRAINING PROGRAM

## 2025-05-28 RX ORDER — OMEPRAZOLE 20 MG/1
20 CAPSULE, DELAYED RELEASE ORAL 2 TIMES DAILY
Qty: 180 CAPSULE | Refills: 1 | Status: SHIPPED | OUTPATIENT
Start: 2025-05-28

## 2025-05-28 RX ORDER — ONDANSETRON 4 MG/1
4 TABLET, FILM COATED ORAL EVERY 6 HOURS PRN
COMMUNITY

## 2025-05-28 NOTE — LETTER
To whom it may concern:      Please excuse Rico Estrada from work on 5/23/25 due to illness.         Dr. Bello

## 2025-05-28 NOTE — PATIENT INSTRUCTIONS
Thank you for coming to see me today! Here are a couple of pieces of information about my schedule and communication practices.     I am not in the clinic on Tuesdays. Non-urgent calls and messages received on Tuesdays will be addressed as soon as I am able when I am back in the office on the next business day. Urgent calls will be addressed by a covering clinician.       If lab work was done today as part of your evaluation you will generally be contacted via [employer], mail, or phone with the results within 7-10 days.  If there is an alarming/concerning result we will contact you by phone. Lab results come back at varying times, I generally wait until all labs are resulted before making comments on results. Please note, labs are automatically released to [employer] [facility] once available, but it may take a couple of days for my interpretation note to appear.      I try very hard to respond to medical messages with 2 business days of receiving them. Occasionally it takes me longer if I am trying to figure out the best way to respond and need to seek guidance, do some research or dig deeper into your medical history to come up with a helpful response.      If you need refills please contact your pharmacist. They will send a refill request to me to review. Please allow 3-5 business days for us to respond to all refill requests.      Please call or send a medical message with any questions or concerns. Thank you for trusting me to be part of your healthcare team!        Dr. Ambrocio Bello

## 2025-05-28 NOTE — PROGRESS NOTES
Rico is a 39 year old who is being evaluated via a billable video visit.    How would you like to obtain your AVS? MyChart  If the video visit is dropped, the invitation should be resent by: Text to cell phone: [phone]  Will anyone else be joining your video visit? No    Assessment & Plan        Encounter for completion of form with patient  Fibromyalgia  Chronic fatigue syndrome  Pt is working with multiple [place] specialists-diagnosed with fibromyalgia and chronic fatigue syndrome. Doing their fibro/CFS program. Can return to work, but part time w/o restrictions. On duloxetine 40mg BID; stopped gabapentin. Reviewed form for short term disability. Will fill out form/fax when I return to the office on 5/29. Follow up in 2 months.    Gastroesophageal reflux disease without esophagitis  Having nausea when he wakes up. Sometimes with exertion. Taking PPI 20mg AM currently. Wonder if having worsening reflux at night. Will try increasing PPI to 20mg BID and monitor for improvement. He has zofran, but it doesn't help much. Also discussed trying todd, mint.      The longitudinal plan of care for the diagnosis(es)/condition(s) as documented were addressed during this visit. Due to the added complexity in care, I will continue to support Rico in the subsequent management and with ongoing continuity of care.    Subjective   Rico is a 39 year old, presenting for the following health issues:  FMLA form and Follow Up (Fibromyalgia update )        5/28/2025     8:01 AM   Additional Questions   Roomed by Tani OLIVAS     History of Present Illness       Mental Health Follow-up:  Patient presents to follow-up on Depression & Anxiety.Patient's depression since last visit has been:  Medium  The patient is having other symptoms associated with depression.  Patient's anxiety since last visit has been:  Medium  The patient is having other symptoms associated with anxiety.  Any significant life events: job concerns and  financial concerns  Patient is feeling anxious or having panic attacks.  Patient has no concerns about alcohol or drug use.    Reason for visit:  Follow up    He eats 2-3 servings of fruits and vegetables daily.He consumes 0 sweetened beverage(s) daily.He exercises with enough effort to increase his heart rate 10 to 19 minutes per day.  He exercises with enough effort to increase his heart rate 3 or less days per week. He is missing 1 dose(s) of medications per week.  He is not taking prescribed medications regularly due to remembering to take.      Fibromyalgia update  Seeing [facility]  Chronic fatigue syndrome  Short term disability forms  First visit  Last office visit:  Next office: 6/6, 6/12, 7/1, 7/8, 7/15  Height: 6  232lbs  Plan:  following with [facility] for chronic fatigue/fibromyalgia program  duloxetine 40mg BID;   needs to spend more time to rest, reduce stress, work a reduced work schedule   Part time  Return to work date: 5/21  24-25 hours (hours per day     [employer] (employer name)          Objective    Vitals - Patient Reported  Pain Score: No Pain (0)      Vitals:  No vitals were obtained today due to virtual visit.    Physical Exam   GENERAL: alert and no distress  EYES: Eyes grossly normal to inspection.  No discharge or erythema, or obvious scleral/conjunctival abnormalities.  RESP: No audible wheeze, cough, or visible cyanosis.    SKIN: Visible skin clear. No significant rash, abnormal pigmentation or lesions.  NEURO: Cranial nerves grossly intact.  Mentation and speech appropriate for age.  PSYCH: Appropriate affect, tone, and pace of words        Video-Visit Details    Type of service:  Video Visit   Originating Location (pt. Location): Home  Distant Location (provider location):  Off-site  Platform used for Video Visit: Delilah  Signed Electronically by: Ambrocio Bello DO    I spent a total of 32 minutes on the day of the visit.   Time spent by me today doing chart review,  history and exam, documentation and further activities per the note

## 2025-06-18 ENCOUNTER — MYC MEDICAL ADVICE (OUTPATIENT)
Dept: FAMILY MEDICINE | Facility: CLINIC | Age: 40
End: 2025-06-18
Payer: COMMERCIAL

## 2025-06-18 NOTE — LETTER
June 19, 2025      Rico Estrada  [address] [place] [address]        To Whom It May Concern:    Rico Estrada is a patient currently under my care.  Please excuse him from work until 6/20/25 due to illness.        Sincerely,        Ambrocio Bello, DO    Electronically signed

## 2025-06-19 NOTE — TELEPHONE ENCOUNTER
Dr. Bello/covering clinician - see Rivera, appreciate advisement. Work note pended for review/edits if in agreement. Preferred pharmacy pended for any medication recommendations.    ARTHUR EscamillaN, PHN, AMB-BC (she/her)  [facility] RN

## 2025-06-23 NOTE — TELEPHONE ENCOUNTER
Please have him schedule a virtual visit with me for this Wednesday 6/25.  I sent the work note through [employer].    I don't recommend prednisone for flare ups.  Need to sort out what rico is managing, or not managing, regarding his fibromyalgia.  We can discuss pregabalin at this appt.    Dr. Bello

## 2025-07-16 ENCOUNTER — VIRTUAL VISIT (OUTPATIENT)
Dept: FAMILY MEDICINE | Facility: CLINIC | Age: 40
End: 2025-07-16
Payer: COMMERCIAL

## 2025-07-16 ENCOUNTER — DOCUMENTATION ONLY (OUTPATIENT)
Dept: FAMILY MEDICINE | Facility: CLINIC | Age: 40
End: 2025-07-16

## 2025-07-16 ENCOUNTER — ENROLLMENT (OUTPATIENT)
Dept: HOME HEALTH SERVICES | Facility: HOME HEALTH | Age: 40
End: 2025-07-16
Payer: COMMERCIAL

## 2025-07-16 DIAGNOSIS — M79.7 FIBROMYALGIA: Primary | ICD-10-CM

## 2025-07-16 DIAGNOSIS — G93.32 CHRONIC FATIGUE SYNDROME: ICD-10-CM

## 2025-07-16 DIAGNOSIS — F41.9 ANXIETY AND DEPRESSION: ICD-10-CM

## 2025-07-16 DIAGNOSIS — F33.1 MODERATE RECURRENT MAJOR DEPRESSION (H): ICD-10-CM

## 2025-07-16 DIAGNOSIS — R11.0 NAUSEA: ICD-10-CM

## 2025-07-16 DIAGNOSIS — F32.A ANXIETY AND DEPRESSION: ICD-10-CM

## 2025-07-16 DIAGNOSIS — M79.7 FIBROMYALGIA: ICD-10-CM

## 2025-07-16 PROCEDURE — 98006 SYNCH AUDIO-VIDEO EST MOD 30: CPT | Performed by: STUDENT IN AN ORGANIZED HEALTH CARE EDUCATION/TRAINING PROGRAM

## 2025-07-16 RX ORDER — PROCHLORPERAZINE MALEATE 10 MG
10 TABLET ORAL EVERY 6 HOURS PRN
Qty: 30 TABLET | Refills: 1 | Status: SHIPPED | OUTPATIENT
Start: 2025-07-16

## 2025-07-16 RX ORDER — PREGABALIN 75 MG/1
75 CAPSULE ORAL 2 TIMES DAILY
Qty: 180 CAPSULE | Refills: 0 | Status: SHIPPED | OUTPATIENT
Start: 2025-07-16

## 2025-07-16 ASSESSMENT — PATIENT HEALTH QUESTIONNAIRE - PHQ9: SUM OF ALL RESPONSES TO PHQ QUESTIONS 1-9: 10

## 2025-07-16 NOTE — PROGRESS NOTES
Rico is a 39 year old who is being evaluated via a billable video visit.    How would you like to obtain your AVS? MyChart  If the video visit is dropped, the invitation should be resent by: Text to cell phone: [phone]  Will anyone else be joining your video visit? No    Assessment & Plan     Fibromyalgia  Symptoms of chronic pain, fatigue, nausea flared. Working with the [place] fibromyalgia program. Having to miss work due to this-might get fired. Working on disability. Will try adding lyrica 75mg BID. Counseled on risks/benefits. Continue duloxetine. Will also refer to pool therapy at [facility]. Pt had questions about prednisone for flares-discussed this is not recommended due to risks. Follow up in 1 month.  - pregabalin (LYRICA) 75 MG capsule  Dispense: 180 capsule; Refill: 0  - Other Specialty Referral    Nausea  Chronic fatigue syndrome  Struggling with daily nausea. Worse in the AM when he wakes up. But happens throughout the day. Have been unable to find a trigger. Not medication or food related. Started 5 yrs ago, but worsening. Sometimes vomits. Zofran, PPI and reglan not helpful. Will try compazine PRN. Should not take with reglan. Will try short term home infusions to see if fluid replacement helps. Advised this is not a long term solution due to risks of infection/thrombosis. Seeing [facility] soon as well.  - prochlorperazine (COMPAZINE) 10 MG tablet  Dispense: 30 tablet; Refill: 1  - Home Infusion Referral - AMB/ED      The longitudinal plan of care for the diagnosis(es)/condition(s) as documented were addressed during this visit. Due to the added complexity in care, I will continue to support Rico in the subsequent management and with ongoing continuity of care.    Subjective   Rico is a 39 year old, presenting for the following health issues:  No chief complaint on file.        5/28/2025     8:01 AM   Additional Questions   Roomed by Tani OLIVAS     History of Present Illness       Reason  for visit:  Check med   He is taking medications regularly.        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    Physical Exam   GENERAL: alert and no distress  EYES: Eyes grossly normal to inspection.  No discharge or erythema, or obvious scleral/conjunctival abnormalities.  RESP: No audible wheeze, cough, or visible cyanosis.    SKIN: Visible skin clear. No significant rash, abnormal pigmentation or lesions.  NEURO: Cranial nerves grossly intact.  Mentation and speech appropriate for age.  PSYCH: Appropriate affect, tone, and pace= of words        Video-Visit Details    Type of service:  Video Visit   Originating Location (pt. Location): Home  Distant Location (provider location):  Off-site  Platform used for Video Visit: Delilah  Signed Electronically by: Ambrocio Bello DO

## 2025-07-17 RX ORDER — DULOXETINE 40 MG/1
1 CAPSULE, DELAYED RELEASE ORAL 2 TIMES DAILY
Qty: 180 CAPSULE | Refills: 3 | Status: SHIPPED | OUTPATIENT
Start: 2025-07-17

## 2025-07-31 ENCOUNTER — TELEPHONE (OUTPATIENT)
Dept: FAMILY MEDICINE | Facility: CLINIC | Age: 40
End: 2025-07-31
Payer: COMMERCIAL

## 2025-07-31 NOTE — TELEPHONE ENCOUNTER
----- Message from Peter Amezcua sent at 7/31/2025  2:45 PM CDT -----  Hi,     Out of Network referral request has been completed and faxed to [facility] for patient to have pool therapy at [facility].     Please fax supporting notes to [employer] ([phone] fax).  Order placed 7/16/25.     [facility] will make final decision to approve or deny request.     Thank you,   Peter GEIGER [facility] Referral [facility].

## 2025-08-13 ENCOUNTER — ENROLLMENT (OUTPATIENT)
Dept: HOME HEALTH SERVICES | Facility: HOME HEALTH | Age: 40
End: 2025-08-13
Payer: COMMERCIAL

## 2025-08-13 ENCOUNTER — VIRTUAL VISIT (OUTPATIENT)
Dept: FAMILY MEDICINE | Facility: CLINIC | Age: 40
End: 2025-08-13
Payer: COMMERCIAL

## 2025-08-13 DIAGNOSIS — F33.1 MODERATE RECURRENT MAJOR DEPRESSION (H): ICD-10-CM

## 2025-08-13 DIAGNOSIS — G93.32 CHRONIC FATIGUE SYNDROME: Primary | ICD-10-CM

## 2025-08-13 DIAGNOSIS — G93.32 CHRONIC FATIGUE SYNDROME: ICD-10-CM

## 2025-08-13 DIAGNOSIS — R11.0 NAUSEA: ICD-10-CM

## 2025-08-13 DIAGNOSIS — M79.7 FIBROMYALGIA: ICD-10-CM

## 2025-08-13 DIAGNOSIS — R11.0 NAUSEA: Primary | ICD-10-CM

## 2025-08-13 PROBLEM — G47.10 HYPERSOMNIA: Status: ACTIVE | Noted: 2025-07-18

## 2025-08-13 PROBLEM — R94.2 ABNORMAL RESULTS OF PULMONARY FUNCTION STUDIES: Status: ACTIVE | Noted: 2025-07-18

## 2025-08-13 PROCEDURE — 98005 SYNCH AUDIO-VIDEO EST LOW 20: CPT | Performed by: STUDENT IN AN ORGANIZED HEALTH CARE EDUCATION/TRAINING PROGRAM

## 2025-08-13 RX ORDER — PREDNISONE 20 MG/1
TABLET ORAL
COMMUNITY
Start: 2025-08-07

## 2025-08-13 RX ORDER — ROSUVASTATIN CALCIUM 5 MG/1
5 TABLET, COATED ORAL WEEKLY
COMMUNITY
Start: 2025-05-15

## 2025-08-13 RX ORDER — CLONAZEPAM 0.5 MG/1
TABLET ORAL
COMMUNITY
Start: 2025-07-29

## 2025-08-13 RX ORDER — ONDANSETRON 2 MG/ML
4 INJECTION INTRAMUSCULAR; INTRAVENOUS ONCE
OUTPATIENT
Start: 2025-08-13 | End: 2025-08-13

## 2025-08-13 RX ORDER — HEPARIN SODIUM,PORCINE 10 UNIT/ML
5-20 VIAL (ML) INTRAVENOUS DAILY PRN
OUTPATIENT
Start: 2025-08-13

## 2025-08-13 RX ORDER — ONDANSETRON 2 MG/ML
4 INJECTION INTRAMUSCULAR; INTRAVENOUS ONCE
Status: CANCELLED | OUTPATIENT
Start: 2025-08-13 | End: 2025-08-13

## 2025-08-13 RX ORDER — HEPARIN SODIUM (PORCINE) LOCK FLUSH IV SOLN 100 UNIT/ML 100 UNIT/ML
5 SOLUTION INTRAVENOUS
OUTPATIENT
Start: 2025-08-13

## 2025-08-13 RX ORDER — ONDANSETRON 2 MG/ML
4 INJECTION INTRAMUSCULAR; INTRAVENOUS WEEKLY
Qty: 102 ML | Refills: 0 | Status: ACTIVE | OUTPATIENT
Start: 2025-08-13 | End: 2026-08-13

## 2025-08-13 RX ORDER — MULTIVITAMIN
1 TABLET ORAL DAILY
COMMUNITY

## 2025-08-13 RX ORDER — NAPROXEN SODIUM 220 MG/1
220 TABLET, FILM COATED ORAL
COMMUNITY

## 2025-08-13 ASSESSMENT — PATIENT HEALTH QUESTIONNAIRE - PHQ9: SUM OF ALL RESPONSES TO PHQ QUESTIONS 1-9: 6

## 2025-08-19 ENCOUNTER — HOME INFUSION BILLING (OUTPATIENT)
Dept: HOME HEALTH SERVICES | Facility: HOME HEALTH | Age: 40
End: 2025-08-19
Payer: COMMERCIAL

## 2025-08-19 ENCOUNTER — HOME INFUSION (OUTPATIENT)
Dept: HOME HEALTH SERVICES | Facility: HOME HEALTH | Age: 40
End: 2025-08-19
Payer: COMMERCIAL

## 2025-08-19 DIAGNOSIS — R11.0 NAUSEA: ICD-10-CM

## 2025-08-19 PROCEDURE — S1015 IV TUBING EXTENSION SET: HCPCS

## 2025-08-19 PROCEDURE — A4245 ALCOHOL WIPES PER BOX: HCPCS

## 2025-08-19 PROCEDURE — A4452 WATERPROOF TAPE: HCPCS

## 2025-08-19 PROCEDURE — A4208 3 CC STERILE SYRINGE&NEEDLE: HCPCS

## 2025-08-19 PROCEDURE — E0776 IV POLE: HCPCS

## 2025-08-20 ENCOUNTER — HOME CARE VISIT (OUTPATIENT)
Dept: HOME HEALTH SERVICES | Facility: HOME HEALTH | Age: 40
End: 2025-08-20
Payer: COMMERCIAL

## 2025-08-20 VITALS
DIASTOLIC BLOOD PRESSURE: 70 MMHG | SYSTOLIC BLOOD PRESSURE: 110 MMHG | HEART RATE: 76 BPM | OXYGEN SATURATION: 98 % | RESPIRATION RATE: 16 BRPM | TEMPERATURE: 97.6 F

## 2025-08-20 PROCEDURE — 99601 HOME NFS VISIT <2 HRS: CPT

## 2025-08-20 PROCEDURE — S9351 HIT CONT ANTIEMETIC DIEM: HCPCS

## 2025-08-20 PROCEDURE — S9374 HIT HYDRA 1 LITER DIEM: HCPCS | Mod: SH

## 2025-08-20 RX ORDER — PROCHLORPERAZINE MALEATE 10 MG
TABLET ORAL
Qty: 30 TABLET | Refills: 3 | Status: SHIPPED | OUTPATIENT
Start: 2025-08-20

## 2025-08-25 ENCOUNTER — HOME INFUSION BILLING (OUTPATIENT)
Dept: HOME HEALTH SERVICES | Facility: HOME HEALTH | Age: 40
End: 2025-08-25
Payer: COMMERCIAL

## 2025-08-25 ENCOUNTER — HOME CARE VISIT (OUTPATIENT)
Dept: HOME HEALTH SERVICES | Facility: HOME HEALTH | Age: 40
End: 2025-08-25
Payer: COMMERCIAL

## 2025-08-25 VITALS
TEMPERATURE: 97 F | OXYGEN SATURATION: 96 % | DIASTOLIC BLOOD PRESSURE: 82 MMHG | RESPIRATION RATE: 16 BRPM | HEART RATE: 76 BPM | SYSTOLIC BLOOD PRESSURE: 120 MMHG

## 2025-08-25 PROCEDURE — S9351 HIT CONT ANTIEMETIC DIEM: HCPCS

## 2025-08-25 PROCEDURE — S9374 HIT HYDRA 1 LITER DIEM: HCPCS | Mod: SH

## 2025-08-25 PROCEDURE — S1015 IV TUBING EXTENSION SET: HCPCS

## 2025-08-25 PROCEDURE — A4208 3 CC STERILE SYRINGE&NEEDLE: HCPCS

## 2025-08-25 PROCEDURE — 99601 HOME NFS VISIT <2 HRS: CPT

## 2025-08-27 ENCOUNTER — TELEPHONE (OUTPATIENT)
Dept: HOME HEALTH SERVICES | Facility: HOME HEALTH | Age: 40
End: 2025-08-27
Payer: COMMERCIAL

## 2025-08-27 DIAGNOSIS — R11.0 NAUSEA: ICD-10-CM

## 2025-08-27 DIAGNOSIS — G93.32 CHRONIC FATIGUE SYNDROME: ICD-10-CM

## 2025-08-29 ENCOUNTER — HOME INFUSION BILLING (OUTPATIENT)
Dept: HOME HEALTH SERVICES | Facility: HOME HEALTH | Age: 40
End: 2025-08-29
Payer: COMMERCIAL

## 2025-08-29 PROCEDURE — A4208 3 CC STERILE SYRINGE&NEEDLE: HCPCS

## 2025-08-29 PROCEDURE — S1015 IV TUBING EXTENSION SET: HCPCS

## 2025-09-01 PROCEDURE — S9374 HIT HYDRA 1 LITER DIEM: HCPCS

## 2025-09-03 ENCOUNTER — HOME INFUSION BILLING (OUTPATIENT)
Dept: HOME HEALTH SERVICES | Facility: HOME HEALTH | Age: 40
End: 2025-09-03
Payer: COMMERCIAL

## 2025-09-03 ENCOUNTER — HOME CARE VISIT (OUTPATIENT)
Dept: HOME HEALTH SERVICES | Facility: HOME HEALTH | Age: 40
End: 2025-09-03
Payer: COMMERCIAL

## 2025-09-03 VITALS
RESPIRATION RATE: 18 BRPM | HEART RATE: 120 BPM | TEMPERATURE: 97.6 F | SYSTOLIC BLOOD PRESSURE: 124 MMHG | OXYGEN SATURATION: 98 % | DIASTOLIC BLOOD PRESSURE: 78 MMHG

## 2025-09-03 PROCEDURE — 99601 HOME NFS VISIT <2 HRS: CPT

## 2025-09-03 RX ORDER — DULOXETIN HYDROCHLORIDE 60 MG/1
60 CAPSULE, DELAYED RELEASE ORAL 2 TIMES DAILY
COMMUNITY
Start: 2025-09-03

## 2025-09-04 PROCEDURE — S9374 HIT HYDRA 1 LITER DIEM: HCPCS
